# Patient Record
Sex: FEMALE | Race: WHITE
[De-identification: names, ages, dates, MRNs, and addresses within clinical notes are randomized per-mention and may not be internally consistent; named-entity substitution may affect disease eponyms.]

---

## 2017-04-05 ENCOUNTER — HOSPITAL ENCOUNTER (OUTPATIENT)
Dept: HOSPITAL 33 - SDC | Age: 77
Discharge: HOME | End: 2017-04-05
Attending: FAMILY MEDICINE
Payer: MEDICARE

## 2017-04-05 VITALS — OXYGEN SATURATION: 94 % | DIASTOLIC BLOOD PRESSURE: 65 MMHG | SYSTOLIC BLOOD PRESSURE: 118 MMHG | HEART RATE: 67 BPM

## 2017-04-05 DIAGNOSIS — D64.9: Primary | ICD-10-CM

## 2017-04-05 PROCEDURE — 36415 COLL VENOUS BLD VENIPUNCTURE: CPT

## 2017-04-05 PROCEDURE — 96366 THER/PROPH/DIAG IV INF ADDON: CPT

## 2017-04-05 PROCEDURE — 96365 THER/PROPH/DIAG IV INF INIT: CPT

## 2017-04-05 PROCEDURE — 86922 COMPATIBILITY TEST ANTIGLOB: CPT

## 2017-04-05 PROCEDURE — 86901 BLOOD TYPING SEROLOGIC RH(D): CPT

## 2017-04-05 PROCEDURE — 86900 BLOOD TYPING SEROLOGIC ABO: CPT

## 2017-04-05 PROCEDURE — 36430 TRANSFUSION BLD/BLD COMPNT: CPT

## 2017-04-05 PROCEDURE — 96374 THER/PROPH/DIAG INJ IV PUSH: CPT

## 2017-04-05 PROCEDURE — 86850 RBC ANTIBODY SCREEN: CPT

## 2018-01-02 ENCOUNTER — HOSPITAL ENCOUNTER (INPATIENT)
Dept: HOSPITAL 33 - ED | Age: 78
LOS: 5 days | Discharge: HOME | DRG: 195 | End: 2018-01-07
Attending: FAMILY MEDICINE | Admitting: FAMILY MEDICINE
Payer: MEDICARE

## 2018-01-02 DIAGNOSIS — J18.1: ICD-10-CM

## 2018-01-02 DIAGNOSIS — D46.9: ICD-10-CM

## 2018-01-02 DIAGNOSIS — D64.9: ICD-10-CM

## 2018-01-02 DIAGNOSIS — J02.9: ICD-10-CM

## 2018-01-02 DIAGNOSIS — R10.13: ICD-10-CM

## 2018-01-02 DIAGNOSIS — Z79.899: ICD-10-CM

## 2018-01-02 DIAGNOSIS — E87.6: ICD-10-CM

## 2018-01-02 DIAGNOSIS — I10: ICD-10-CM

## 2018-01-02 DIAGNOSIS — J18.9: Primary | ICD-10-CM

## 2018-01-02 DIAGNOSIS — M54.6: ICD-10-CM

## 2018-01-02 LAB
ALBUMIN SERPL-MCNC: 3.2 G/DL (ref 3.4–5)
ALP SERPL-CCNC: 86 U/L (ref 46–116)
ALT SERPL-CCNC: 18 U/L (ref 12–78)
AMYLASE SERPL-CCNC: 52 U/L (ref 25–115)
ANION GAP SERPL CALC-SCNC: 12.9 MEQ/L (ref 5–15)
AST SERPL QL: 19 U/L (ref 15–37)
BILIRUB BLD-MCNC: 0.6 MG/DL (ref 0.2–1)
BUN SERPL-MCNC: 15 MG/DL (ref 9–20)
CALCIUM SPEC-MCNC: 9.6 MG/DL (ref 8.5–10.1)
CELLS COUNTED: 100
CHLORIDE SERPL-SCNC: 101 MEQ/L (ref 98–107)
CO2 SERPL-SCNC: 27.1 MEQ/L (ref 21–32)
CREAT SERPL-MCNC: 0.82 MG/DL (ref 0.55–1.3)
FLUAV AG NPH QL IA: NEGATIVE
FLUBV AG NPH QL IA: NEGATIVE
GFR SERPLBLD BASED ON 1.73 SQ M-ARVRAT: > 60 ML/MIN
GLUCOSE SERPL-MCNC: 133 MG/DL (ref 70–110)
GRANULOCYTES # BLD AUTO: 11.24 10*3/UL (ref 1.4–6.9)
HCT VFR BLD AUTO: 44 % (ref 35–47)
HGB BLD-MCNC: 13.9 GM/DL (ref 12–16)
LIPASE SERPL-CCNC: 71 U/L (ref 73–393)
MANUAL DIF COMMENT BLD-IMP: NORMAL
MCH RBC QN AUTO: 31.2 PG (ref 26–32)
MCHC RBC AUTO-ENTMCNC: 31.6 G/DL (ref 32–36)
PLATELET # BLD AUTO: 224 K/MM3 (ref 150–450)
POTASSIUM SERPLBLD-SCNC: 3.9 MEQ/L (ref 3.5–5.1)
PROT SERPL-MCNC: 7.9 GM/DL (ref 6.4–8.2)
RBC # BLD AUTO: 4.46 M/MM3 (ref 4.1–5.4)
RSV AG SPEC QL IA: NEGATIVE
SODIUM SERPL-SCNC: 137 MEQ/L (ref 136–145)
VARIANT LYMPHS BLD QL SMEAR: 2 %
WBC # BLD AUTO: 12.9 K/MM3 (ref 4–10.5)

## 2018-01-02 PROCEDURE — 36000 PLACE NEEDLE IN VEIN: CPT

## 2018-01-02 PROCEDURE — 83036 HEMOGLOBIN GLYCOSYLATED A1C: CPT

## 2018-01-02 PROCEDURE — 94640 AIRWAY INHALATION TREATMENT: CPT

## 2018-01-02 PROCEDURE — 80048 BASIC METABOLIC PNL TOTAL CA: CPT

## 2018-01-02 PROCEDURE — 93005 ELECTROCARDIOGRAM TRACING: CPT

## 2018-01-02 PROCEDURE — 81000 URINALYSIS NONAUTO W/SCOPE: CPT

## 2018-01-02 PROCEDURE — 87086 URINE CULTURE/COLONY COUNT: CPT

## 2018-01-02 PROCEDURE — 96367 TX/PROPH/DG ADDL SEQ IV INF: CPT

## 2018-01-02 PROCEDURE — 96361 HYDRATE IV INFUSION ADD-ON: CPT

## 2018-01-02 PROCEDURE — 87631 RESP VIRUS 3-5 TARGETS: CPT

## 2018-01-02 PROCEDURE — 94760 N-INVAS EAR/PLS OXIMETRY 1: CPT

## 2018-01-02 PROCEDURE — 80053 COMPREHEN METABOLIC PANEL: CPT

## 2018-01-02 PROCEDURE — 85025 COMPLETE CBC W/AUTO DIFF WBC: CPT

## 2018-01-02 PROCEDURE — 87070 CULTURE OTHR SPECIMN AEROBIC: CPT

## 2018-01-02 PROCEDURE — 83735 ASSAY OF MAGNESIUM: CPT

## 2018-01-02 PROCEDURE — 36415 COLL VENOUS BLD VENIPUNCTURE: CPT

## 2018-01-02 PROCEDURE — 84484 ASSAY OF TROPONIN QUANT: CPT

## 2018-01-02 PROCEDURE — 71046 X-RAY EXAM CHEST 2 VIEWS: CPT

## 2018-01-02 PROCEDURE — 87040 BLOOD CULTURE FOR BACTERIA: CPT

## 2018-01-02 PROCEDURE — 82150 ASSAY OF AMYLASE: CPT

## 2018-01-02 PROCEDURE — 96360 HYDRATION IV INFUSION INIT: CPT

## 2018-01-02 PROCEDURE — 96375 TX/PRO/DX INJ NEW DRUG ADDON: CPT

## 2018-01-02 PROCEDURE — 83690 ASSAY OF LIPASE: CPT

## 2018-01-02 PROCEDURE — 87430 STREP A AG IA: CPT

## 2018-01-02 PROCEDURE — 96365 THER/PROPH/DIAG IV INF INIT: CPT

## 2018-01-02 PROCEDURE — 99285 EMERGENCY DEPT VISIT HI MDM: CPT

## 2018-01-02 RX ADMIN — ACETAMINOPHEN PRN MG: 325 TABLET ORAL at 23:30

## 2018-01-02 RX ADMIN — ALBUTEROL SULFATE SCH MG: 2.5 SOLUTION RESPIRATORY (INHALATION) at 23:29
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
## 2018-01-03 LAB
ALBUMIN SERPL-MCNC: 2.8 G/DL (ref 3.4–5)
ALP SERPL-CCNC: 83 U/L (ref 46–116)
ALT SERPL-CCNC: 17 U/L (ref 12–78)
ANION GAP SERPL CALC-SCNC: 12.4 MEQ/L (ref 5–15)
AST SERPL QL: 17 U/L (ref 15–37)
BASOPHILS # BLD AUTO: 0.01 10*3/UL (ref 0–0.4)
BASOPHILS NFR BLD AUTO: 0.1 % (ref 0–0.4)
BILIRUB BLD-MCNC: 0.4 MG/DL (ref 0.2–1)
BUN SERPL-MCNC: 13 MG/DL (ref 9–20)
CALCIUM SPEC-MCNC: 9.1 MG/DL (ref 8.5–10.1)
CHLORIDE SERPL-SCNC: 105 MEQ/L (ref 98–107)
CO2 SERPL-SCNC: 26 MEQ/L (ref 21–32)
CREAT SERPL-MCNC: 0.88 MG/DL (ref 0.55–1.3)
EOSINOPHIL # BLD AUTO: 0.03 10*3/UL (ref 0–0.5)
GFR SERPLBLD BASED ON 1.73 SQ M-ARVRAT: > 60 ML/MIN
GLUCOSE SERPL-MCNC: 131 MG/DL (ref 70–110)
GLUCOSE UR-MCNC: NEGATIVE MG/DL
GRANULOCYTES # BLD AUTO: 8.84 10*3/UL (ref 1.4–6.9)
HCT VFR BLD AUTO: 41.1 % (ref 35–47)
HGB BLD-MCNC: 13 GM/DL (ref 12–16)
LYMPHOCYTES # SPEC AUTO: 0.92 10*3/UL (ref 1–4.6)
MCH RBC QN AUTO: 31.5 PG (ref 26–32)
MCHC RBC AUTO-ENTMCNC: 31.6 G/DL (ref 32–36)
MONOCYTES # BLD AUTO: 1.04 10*3/UL (ref 0–1.3)
NEUTROPHILS NFR BLD AUTO: 81.5 % (ref 36–66)
PLATELET # BLD AUTO: 206 K/MM3 (ref 150–450)
POTASSIUM SERPLBLD-SCNC: 3.6 MEQ/L (ref 3.5–5.1)
PROT SERPL-MCNC: 7.5 GM/DL (ref 6.4–8.2)
PROT UR STRIP-MCNC: (no result) MG/DL
RBC # BLD AUTO: 4.13 M/MM3 (ref 4.1–5.4)
SODIUM SERPL-SCNC: 140 MEQ/L (ref 136–145)
WBC # BLD AUTO: 10.8 K/MM3 (ref 4–10.5)
WBC URNS QL MICRO: (no result) /HPF (ref 0–5)

## 2018-01-03 RX ADMIN — ALBUTEROL SULFATE SCH MG: 2.5 SOLUTION RESPIRATORY (INHALATION) at 10:39

## 2018-01-03 RX ADMIN — CEFTRIAXONE SCH MLS/HR: 1 INJECTION, SOLUTION INTRAVENOUS at 21:02

## 2018-01-03 RX ADMIN — ALBUTEROL SULFATE SCH MG: 2.5 SOLUTION RESPIRATORY (INHALATION) at 06:54

## 2018-01-03 RX ADMIN — HYDROCHLOROTHIAZIDE SCH MG: 25 TABLET ORAL at 09:38

## 2018-01-03 RX ADMIN — CLONIDINE HYDROCHLORIDE SCH MG: 0.1 TABLET ORAL at 09:38

## 2018-01-03 RX ADMIN — ALBUTEROL SULFATE SCH MG: 2.5 SOLUTION RESPIRATORY (INHALATION) at 23:06

## 2018-01-03 RX ADMIN — ALBUTEROL SULFATE SCH MG: 2.5 SOLUTION RESPIRATORY (INHALATION) at 14:47

## 2018-01-03 RX ADMIN — ALBUTEROL SULFATE SCH MG: 2.5 SOLUTION RESPIRATORY (INHALATION) at 03:35

## 2018-01-03 RX ADMIN — AMLODIPINE BESYLATE SCH MG: 5 TABLET ORAL at 09:38

## 2018-01-03 RX ADMIN — KETOROLAC TROMETHAMINE PRN MG: 30 INJECTION, SOLUTION INTRAMUSCULAR; INTRAVENOUS at 14:35

## 2018-01-03 RX ADMIN — ALBUTEROL SULFATE SCH MG: 2.5 SOLUTION RESPIRATORY (INHALATION) at 19:00

## 2018-01-03 RX ADMIN — CLONIDINE HYDROCHLORIDE SCH MG: 0.1 TABLET ORAL at 21:03

## 2018-01-03 RX ADMIN — KETOROLAC TROMETHAMINE PRN MG: 30 INJECTION, SOLUTION INTRAMUSCULAR; INTRAVENOUS at 06:28

## 2018-01-03 RX ADMIN — AZITHROMYCIN DIHYDRATE SCH MLS/HR: 500 INJECTION, POWDER, LYOPHILIZED, FOR SOLUTION INTRAVENOUS at 21:02

## 2018-01-03 NOTE — XRAY
Indication: Chest pain, short of breath, and cough.



Comparison: April 25, 2015.



AP/lateral chest less inflated today with new bibasilar

infiltrates/atelectasis and tiny effusions.  Heart is not enlarged for AP

portable technique.  Bony thorax intact again with osteopenia, degenerative

changes, and mid thoracic kyphoplasty.

## 2018-01-04 LAB
ANION GAP SERPL CALC-SCNC: 8.2 MEQ/L (ref 5–15)
BASOPHILS # BLD AUTO: 0.01 10*3/UL (ref 0–0.4)
BASOPHILS NFR BLD AUTO: 0.2 % (ref 0–0.4)
BUN SERPL-MCNC: 8 MG/DL (ref 9–20)
CALCIUM SPEC-MCNC: 8.2 MG/DL (ref 8.5–10.1)
CHLORIDE SERPL-SCNC: 109 MEQ/L (ref 98–107)
CO2 SERPL-SCNC: 27.1 MEQ/L (ref 21–32)
CREAT SERPL-MCNC: 0.62 MG/DL (ref 0.55–1.3)
EOSINOPHIL # BLD AUTO: 0.13 10*3/UL (ref 0–0.5)
GFR SERPLBLD BASED ON 1.73 SQ M-ARVRAT: > 60 ML/MIN
GLUCOSE SERPL-MCNC: 110 MG/DL (ref 70–110)
GRANULOCYTES # BLD AUTO: 4.66 10*3/UL (ref 1.4–6.9)
HCT VFR BLD AUTO: 37.3 % (ref 35–47)
HGB BLD-MCNC: 11.5 GM/DL (ref 12–16)
LYMPHOCYTES # SPEC AUTO: 0.82 10*3/UL (ref 1–4.6)
MCH RBC QN AUTO: 31 PG (ref 26–32)
MCHC RBC AUTO-ENTMCNC: 30.8 G/DL (ref 32–36)
MONOCYTES # BLD AUTO: 0.9 10*3/UL (ref 0–1.3)
NEUTROPHILS NFR BLD AUTO: 71.4 % (ref 36–66)
PLATELET # BLD AUTO: 185 K/MM3 (ref 150–450)
POTASSIUM SERPLBLD-SCNC: 3.2 MEQ/L (ref 3.5–5.1)
RBC # BLD AUTO: 3.7 M/MM3 (ref 4.1–5.4)
SODIUM SERPL-SCNC: 141 MEQ/L (ref 136–145)
WBC # BLD AUTO: 6.5 K/MM3 (ref 4–10.5)

## 2018-01-04 RX ADMIN — CLONIDINE HYDROCHLORIDE SCH MG: 0.1 TABLET ORAL at 09:29

## 2018-01-04 RX ADMIN — HYDROCHLOROTHIAZIDE SCH MG: 25 TABLET ORAL at 09:30

## 2018-01-04 RX ADMIN — KETOROLAC TROMETHAMINE PRN MG: 30 INJECTION, SOLUTION INTRAMUSCULAR; INTRAVENOUS at 02:11

## 2018-01-04 RX ADMIN — CLONIDINE HYDROCHLORIDE SCH MG: 0.1 TABLET ORAL at 21:17

## 2018-01-04 RX ADMIN — ALBUTEROL SULFATE SCH MG: 2.5 SOLUTION RESPIRATORY (INHALATION) at 20:42

## 2018-01-04 RX ADMIN — AZITHROMYCIN DIHYDRATE SCH MLS/HR: 500 INJECTION, POWDER, LYOPHILIZED, FOR SOLUTION INTRAVENOUS at 21:15

## 2018-01-04 RX ADMIN — ALBUTEROL SULFATE SCH MG: 2.5 SOLUTION RESPIRATORY (INHALATION) at 23:49

## 2018-01-04 RX ADMIN — ALBUTEROL SULFATE SCH MG: 2.5 SOLUTION RESPIRATORY (INHALATION) at 14:31

## 2018-01-04 RX ADMIN — ALBUTEROL SULFATE SCH MG: 2.5 SOLUTION RESPIRATORY (INHALATION) at 10:19

## 2018-01-04 RX ADMIN — ALBUTEROL SULFATE SCH MG: 2.5 SOLUTION RESPIRATORY (INHALATION) at 06:40

## 2018-01-04 RX ADMIN — ALBUTEROL SULFATE SCH MG: 2.5 SOLUTION RESPIRATORY (INHALATION) at 03:18

## 2018-01-04 RX ADMIN — AMLODIPINE BESYLATE SCH MG: 5 TABLET ORAL at 09:29

## 2018-01-04 RX ADMIN — CEFTRIAXONE SCH MLS/HR: 1 INJECTION, SOLUTION INTRAVENOUS at 19:54

## 2018-01-04 RX ADMIN — ACETAMINOPHEN PRN MG: 325 TABLET ORAL at 21:21

## 2018-01-04 NOTE — PCM.NOTE
Date and Time: 01/04/18 0859





Subjective Assessment: 





Pt was up walking, pain is now 3/10 in R chest.  Some cough. Darling po.





- Review of Systems


Constitutional: No Fever


Respiratory: Cough





Objective Exam


General Appearance: no apparent distress, alert


Neurologic Exam: oriented x 3, cooperative


Skin Exam: normal color, warm, dry


Eye Exam: eyes nml inspection


Ears, Nose, Throat Exam: moist mucous membranes


Respiratory Exam: normal breath sounds, lungs clear, No crackles/rales, No 

rhonchi, No wheezing


Cardiovascular Exam: regular rate/rhythm, normal heart sounds, No murmur


Extremity Exam: No pedal edema, No swelling


Back Exam: normal inspection, No rash





OBJECTIVE DATA


Vital Signs: 


 Vital Signs - 24 hr











  Temp Pulse Resp BP Pulse Ox


 


 01/04/18 07:43    18  


 


 01/04/18 07:11  98.3 F  88  18  154/70  97


 


 01/04/18 06:43   86  18   95


 


 01/04/18 04:00  99.0 F  91 H  19  143/68  93 L


 


 01/04/18 03:00   88  18   92 L


 


 01/04/18 00:00  98.4 F  95 H  19  136/61  92 L


 


 01/03/18 23:00   81  22   92 L


 


 01/03/18 20:00  98.3 F  90  18  130/60  94 L


 


 01/03/18 19:03   87  18   94 L


 


 01/03/18 16:00  98.7 F  93 H  18  122/58  94 L


 


 01/03/18 14:50   86  18   93 L


 


 01/03/18 11:17  98.2 F  86  18  123/60  94 L


 


 01/03/18 10:42   75  20   93 L








 Oxygen-Last 24 hours











O2 Percentage                  2 Liters = 28%


 


O2 Percentage                  2 Liters = 28%


 


O2 Percentage                  2 Liters = 28%


 


O2 Percentage                  2 Liters = 28%


 


O2 Percentage                  2 Liters = 28%


 


O2 Percentage                  2 Liters = 28%











 Pain Assessment - Last Documented











Pain Intensity                 6


 


Pain Scale Used                0-10 Pain Scale











Intake and Output: 


 Intake & Output











 01/01/18 01/02/18 01/03/18 01/04/18





 11:59 11:59 11:59 11:59


 


Intake Total   240 4263


 


Output Total   400 1700


 


Balance   -160 2563


 


Weight    76.204 kg











Lab Results: 


 Lab Results-Last 24 Hours











  01/03/18 01/03/18 01/04/18 Range/Units





  20:37 23:22 02:25 


 


WBC     (4.0-10.5)  K/mm3


 


RBC     (4.1-5.4)  M/mm3


 


Hgb     (12.0-16.0)  gm/dl


 


Hct     (35-47)  %


 


MCV     ()  fl


 


MCH     (26-32)  pg


 


MCHC     (32-36)  g/dl


 


RDW     (11.5-14.0)  %


 


Plt Count     (150-450)  K/mm3


 


MPV     (6-9.5)  fl


 


Gran %     (36.0-66.0)  %


 


Lymphocytes %     (24.0-44.0)  %


 


Monocytes %     (0.0-12.0)  %


 


Eosinophils %     (0.00-5.0)  %


 


Basophils %     (0.0-0.4)  %


 


Basophils #     (0-0.4)  


 


Sodium     (136-145)  mEq/L


 


Potassium     (3.5-5.1)  mEq/L


 


Chloride     ()  mEq/L


 


Carbon Dioxide     (21-32)  mEq/L


 


Anion Gap     (5-15)  MEQ/L


 


BUN     (9-20)  mg/dL


 


Creatinine     (0.55-1.30)  mg/dl


 


Estimated GFR     ML/MIN


 


Glucose     ()  MG/DL


 


Calcium     (8.5-10.1)  mg/dL


 


Troponin I  < 0.017  < 0.017  < 0.017  (0.000-0.056)  ng/ml














  01/04/18 01/04/18 01/04/18 Range/Units





  05:00 05:00 05:00 


 


WBC  6.5    (4.0-10.5)  K/mm3


 


RBC  3.70 L    (4.1-5.4)  M/mm3


 


Hgb  11.5 L    (12.0-16.0)  gm/dl


 


Hct  37.3    (35-47)  %


 


MCV  100.8 H    ()  fl


 


MCH  31.0    (26-32)  pg


 


MCHC  30.8 L    (32-36)  g/dl


 


RDW  12.3    (11.5-14.0)  %


 


Plt Count  185    (150-450)  K/mm3


 


MPV  10.2 H    (6-9.5)  fl


 


Gran %  71.4 H    (36.0-66.0)  %


 


Lymphocytes %  12.6 L    (24.0-44.0)  %


 


Monocytes %  13.8 H    (0.0-12.0)  %


 


Eosinophils %  2.0    (0.00-5.0)  %


 


Basophils %  0.2    (0.0-0.4)  %


 


Basophils #  0.01    (0-0.4)  


 


Sodium   141   (136-145)  mEq/L


 


Potassium   3.2 L   (3.5-5.1)  mEq/L


 


Chloride   109 H   ()  mEq/L


 


Carbon Dioxide   27.1   (21-32)  mEq/L


 


Anion Gap   8.2   (5-15)  MEQ/L


 


BUN   8 L   (9-20)  mg/dL


 


Creatinine   0.62   (0.55-1.30)  mg/dl


 


Estimated GFR   > 60   ML/MIN


 


Glucose   110   ()  MG/DL


 


Calcium   8.2 L   (8.5-10.1)  mg/dL


 


Troponin I    < 0.017  (0.000-0.056)  ng/ml











Multi-Disciplinary Progress Notes: 


 Multi-Disciplinary Progress Notes





01/03/18 11:44 Case Management Note by Kimberly Vasquez





PT WAS MADE INPT . IMPORTANT MESSAGE FROM MEDICARE GIVEN TO PT SIGNED AND COPY 

TO CHART.





Initialized on 01/03/18 11:44 - END OF NOTE








01/03/18 09:51 Case Management Note by Kimberly Vasquez


MEDICARE [facility] PAPERS GIVEN TO PT SIGNED COPY TO CHART.





Initialized on 01/03/18 09:51 - END OF NOTE

















Assessment/Plan


(1) Pneumonia


Current Visit: Yes   Status: Acute   


Assessment & Plan: 


Improving, still having pain, still on O2.  Will need at least another day, 

perhaps 2, on IV antibiotics before d/c home.


Code(s): J18.9 - PNEUMONIA, UNSPECIFIED ORGANISM   





(2) Abdominal pain


Current Visit: Yes   Status: Acute   


Qualifiers: 


   Abdominal location: epigastric   Qualified Code(s): R10.13 - Epigastric pain

   


Assessment & Plan: 


the RUQ pain much better, I think due to PNA.


Code(s): R10.9 - UNSPECIFIED ABDOMINAL PAIN   





(3) Back pain


Current Visit: Yes   Status: Acute   


Qualifiers: 


   Back pain location: thoracic back pain   Chronicity: acute   Back pain 

laterality: right   Qualified Code(s): M54.6 - Pain in thoracic spine   


Assessment & Plan: 


improved


Code(s): M54.9 - DORSALGIA, UNSPECIFIED

## 2018-01-05 LAB
ANION GAP SERPL CALC-SCNC: 13.5 MEQ/L (ref 5–15)
BUN SERPL-MCNC: 7 MG/DL (ref 9–20)
CALCIUM SPEC-MCNC: 8.4 MG/DL (ref 8.5–10.1)
CHLORIDE SERPL-SCNC: 106 MEQ/L (ref 98–107)
CO2 SERPL-SCNC: 25.6 MEQ/L (ref 21–32)
CREAT SERPL-MCNC: 0.76 MG/DL (ref 0.55–1.3)
GFR SERPLBLD BASED ON 1.73 SQ M-ARVRAT: > 60 ML/MIN
GLUCOSE SERPL-MCNC: 162 MG/DL (ref 70–110)
MAGNESIUM SERPL-MCNC: 1.9 MG/DL (ref 1.8–2.4)
POTASSIUM SERPLBLD-SCNC: 3.2 MEQ/L (ref 3.5–5.1)
SODIUM SERPL-SCNC: 142 MEQ/L (ref 136–145)

## 2018-01-05 RX ADMIN — ALBUTEROL SULFATE SCH MG: 2.5 SOLUTION RESPIRATORY (INHALATION) at 07:07

## 2018-01-05 RX ADMIN — HYDROCHLOROTHIAZIDE SCH MG: 25 TABLET ORAL at 10:19

## 2018-01-05 RX ADMIN — AMLODIPINE BESYLATE SCH MG: 5 TABLET ORAL at 10:19

## 2018-01-05 RX ADMIN — CLONIDINE HYDROCHLORIDE SCH MG: 0.1 TABLET ORAL at 21:02

## 2018-01-05 RX ADMIN — ALBUTEROL SULFATE SCH MG: 2.5 SOLUTION RESPIRATORY (INHALATION) at 22:50

## 2018-01-05 RX ADMIN — ALBUTEROL SULFATE SCH MG: 2.5 SOLUTION RESPIRATORY (INHALATION) at 11:07

## 2018-01-05 RX ADMIN — ALBUTEROL SULFATE SCH MG: 2.5 SOLUTION RESPIRATORY (INHALATION) at 15:41

## 2018-01-05 RX ADMIN — AZITHROMYCIN DIHYDRATE SCH MLS/HR: 500 INJECTION, POWDER, LYOPHILIZED, FOR SOLUTION INTRAVENOUS at 21:57

## 2018-01-05 RX ADMIN — CLONIDINE HYDROCHLORIDE SCH MG: 0.1 TABLET ORAL at 10:19

## 2018-01-05 RX ADMIN — ALBUTEROL SULFATE SCH MG: 2.5 SOLUTION RESPIRATORY (INHALATION) at 03:02

## 2018-01-05 RX ADMIN — ALBUTEROL SULFATE SCH MG: 2.5 SOLUTION RESPIRATORY (INHALATION) at 19:40

## 2018-01-05 RX ADMIN — CEFTRIAXONE SCH MLS/HR: 1 INJECTION, SOLUTION INTRAVENOUS at 21:01

## 2018-01-05 NOTE — PCM.NOTE
Date and Time: 01/05/18  0810





Subjective Assessment: 





She is feeling better.  Pain in R lower chest is perhapes 1/10.  O2 sats have 

been 90-92% on RA.  Did not wear any O2 last night.





Objective Exam


General Appearance: no apparent distress, alert


Neurologic Exam: oriented x 3, cooperative


Skin Exam: normal color, warm, dry, No rash


Respiratory Exam: normal breath sounds, crackles/rales (slight RLL), No rhonchi

, No wheezing


Cardiovascular Exam: regular rate/rhythm, normal heart sounds, No murmur


Extremity Exam: normal inspection


Back Exam: normal inspection, No rash





OBJECTIVE DATA


Vital Signs: 


 Vital Signs - 24 hr











  Temp Pulse Resp BP Pulse Ox


 


 01/05/18 07:34  96.1 F  80  18  149/77  94 L


 


 01/05/18 07:10   83  16   92 L


 


 01/05/18 04:00  98.0 F  88  18  138/80  90 L


 


 01/05/18 03:03   81  18   91 L


 


 01/05/18 00:00  98.0 F  91 H  19  117/61  92 L


 


 01/04/18 23:50   84  18   91 L


 


 01/04/18 20:42   98 H  18   93 L


 


 01/04/18 20:00  98.9 F  105 H  18  155/74  93 L


 


 01/04/18 15:58  97.6 F  86  18  132/60  94 L


 


 01/04/18 15:40    18  


 


 01/04/18 14:33   82  18   95


 


 01/04/18 11:45    18  


 


 01/04/18 11:02  97.7 F  87  18  135/62  93 L


 


 01/04/18 10:23   83  20   98








 Oxygen-Last 24 hours











O2 Percentage                  2 Liters = 28%











 Pain Assessment - Last Documented











Pain Intensity                 0


 


Pain Scale Used                0-10 Pain Scale,FLACC











Intake and Output: 


 Intake & Output











 01/02/18 01/03/18 01/04/18 01/05/18





 11:59 11:59 11:59 11:59


 


Intake Total  240 4263 3811


 


Output Total  400 1700 3900


 


Balance  -160 2563 -89


 


Weight   76.204 kg 











Lab Results: 


 Lab Results-Last 24 Hours











  01/04/18 Range/Units





  08:34 


 


Troponin I  < 0.017  (0.000-0.056)  ng/ml











Multi-Disciplinary Progress Notes: 


 Multi-Disciplinary Progress Notes





01/04/18 12:02 Case Management Note by Kimberly Vasquez





PT CONT TO VOICE NO NEW NEEDS AT DISCHARGE, WILL CONT TO FOLLOW ANY NEEDS.





Initialized on 01/04/18 12:02 - END OF NOTE

















Assessment/Plan


(1) Pneumonia


Current Visit: Yes   Status: Acute   


Assessment & Plan: 


Improved; would like to see if she oxygenates well while walking. She may 

benefit from another day on IV fluids and antibiotics.


Code(s): J18.9 - PNEUMONIA, UNSPECIFIED ORGANISM   





(2) Abdominal pain


Current Visit: Yes   Status: Acute   


Qualifiers: 


   Abdominal location: epigastric   Qualified Code(s): R10.13 - Epigastric pain

   


Assessment & Plan: 


Nearly resolved. Related to pneumonia.


Code(s): R10.9 - UNSPECIFIED ABDOMINAL PAIN   





(3) Back pain


Current Visit: Yes   Status: Resolved   


Qualifiers: 


   Back pain location: thoracic back pain   Chronicity: acute   Back pain 

laterality: right   Qualified Code(s): M54.6 - Pain in thoracic spine   


Code(s): M54.9 - DORSALGIA, UNSPECIFIED   





(4) Anemia


Current Visit: Yes   Status: Acute   


Qualifiers: 


   Anemia type: unspecified type   Qualified Code(s): D64.9 - Anemia, 

unspecified   


Assessment & Plan: 


has hx myelodysplastic d/o, was treated by oncology. will recheck today.


Code(s): D64.9 - ANEMIA, UNSPECIFIED

## 2018-01-06 RX ADMIN — ALBUTEROL SULFATE SCH: 2.5 SOLUTION RESPIRATORY (INHALATION) at 13:39

## 2018-01-06 RX ADMIN — CLONIDINE HYDROCHLORIDE SCH MG: 0.1 TABLET ORAL at 09:41

## 2018-01-06 RX ADMIN — CLONIDINE HYDROCHLORIDE SCH MG: 0.1 TABLET ORAL at 21:47

## 2018-01-06 RX ADMIN — ALBUTEROL SULFATE SCH MG: 2.5 SOLUTION RESPIRATORY (INHALATION) at 03:22

## 2018-01-06 RX ADMIN — CEFTRIAXONE SCH MLS/HR: 1 INJECTION, SOLUTION INTRAVENOUS at 21:47

## 2018-01-06 RX ADMIN — HYDROCHLOROTHIAZIDE SCH MG: 25 TABLET ORAL at 09:41

## 2018-01-06 RX ADMIN — ALBUTEROL SULFATE SCH MG: 2.5 SOLUTION RESPIRATORY (INHALATION) at 23:09

## 2018-01-06 RX ADMIN — AZITHROMYCIN DIHYDRATE SCH MLS/HR: 500 INJECTION, POWDER, LYOPHILIZED, FOR SOLUTION INTRAVENOUS at 22:39

## 2018-01-06 RX ADMIN — AMLODIPINE BESYLATE SCH MG: 5 TABLET ORAL at 09:41

## 2018-01-06 RX ADMIN — ALBUTEROL SULFATE SCH MG: 2.5 SOLUTION RESPIRATORY (INHALATION) at 14:06

## 2018-01-06 RX ADMIN — ALBUTEROL SULFATE SCH MG: 2.5 SOLUTION RESPIRATORY (INHALATION) at 07:20

## 2018-01-06 RX ADMIN — ALBUTEROL SULFATE SCH MG: 2.5 SOLUTION RESPIRATORY (INHALATION) at 19:30

## 2018-01-06 NOTE — PCM.NOTE
Date and Time: 01/06/18  1136





Subjective Assessment: 





Patient reports she was very short of breath and her oxygen saturation dropped 

when she walked yesterday.  She reports they thought about sending her home 

this weekend but then this happened. She reports her appetite has been good.  





- Review of Systems


Constitutional: Weakness


Eyes: No Symptoms


Ears, Nose, & Throat: No Symptoms


Respiratory: Cough, Short Of Breath


Cardiac: No Symptoms


Abdominal/Gastrointestinal: No Symptoms


Genitourinary Symptoms: No Symptoms


Musculoskeletal: No Symptoms


Skin: No Symptoms





Objective Exam


General Appearance: no apparent distress, alert, other (son at bedside)


Neurologic Exam: alert, cooperative, normal mood/affect


Skin Exam: normal color, warm, dry, No rash


Respiratory Exam: other (fine crackles in right lung fields, equal breath sounds

, no wheezing)


Cardiovascular Exam: regular rate/rhythm, normal heart sounds, No murmur, No 

friction rub, No gallop


Gastrointestinal/Abdomen Exam: soft, normal bowel sounds, No tenderness, No 

distention, No mass


Extremity Exam: normal inspection, other (no c/c/e, SCD in place)





OBJECTIVE DATA


Vital Signs: 


 Vital Signs - 24 hr











  Temp Pulse Resp BP Pulse Ox


 


 01/06/18 07:40  98.4 F  88  20  146/76  96


 


 01/06/18 07:00   87  18   95


 


 01/06/18 04:11  97.8 F  84  18  164/77  96


 


 01/06/18 03:23   84  18   96


 


 01/05/18 23:44  98.3 F  93 H  24  152/73  94 L


 


 01/05/18 22:50   86  18   92 L


 


 01/05/18 20:15  98.3 F  86  20  160/74  96


 


 01/05/18 19:41   83  16   95


 


 01/05/18 18:00    16  


 


 01/05/18 16:00  97.9 F  92 H  18  141/67  96


 


 01/05/18 15:44   90  20   94 L


 


 01/05/18 14:00    20  


 


 01/05/18 11:47  97.6 F  81  18  125/60  92 L








 Oxygen-Last 24 hours











O2 Percentage                  2 Liters = 28%


 


O2 Percentage                  2 Liters = 28%


 


O2 Percentage                  2 Liters = 28%


 


O2 Percentage                  2 Liters = 28%











 Pain Assessment - Last Documented











Pain Intensity                 0


 


Pain Scale Used                0-10 Pain Scale











Intake and Output: 


 Intake & Output











 01/04/18 01/05/18 01/06/18 01/07/18





 06:59 06:59 06:59 06:59


 


Intake Total 4383 3931 4443 380


 


Output Total 1400 3500 4000 1700


 


Balance 2983 431 443 -1320


 


Weight 76.204 kg  72.938 kg 











Multi-Disciplinary Progress Notes: 


 Multi-Disciplinary Progress Notes





01/05/18 12:49 Respiratory Note by Narcisa Carpio





PT WALKED DOWN THE DALE WHILE ON ROOM AIR. O2 SAT DROPPED DOWN TO 87%. PT WAS 

THEN PLACED ON 2LPM NASAL CANNULA. O2 SAT INCREASED TO 97%. NURSE AWARE.





Initialized on 01/05/18 12:49 - END OF NOTE

















Assessment/Plan


(1) Pneumonia


Current Visit: Yes   Status: Acute   


Assessment & Plan: 


Continue antibiotics. Try to wean off oxygen. Consider home oxygen evaluation 

tomorrow before possible discharge. Decrease IV fluids as she is taking fluids 

by mouth well.


Code(s): J18.9 - PNEUMONIA, UNSPECIFIED ORGANISM   





(2) Hypokalemia


Current Visit: Yes   Status: Acute   


Assessment & Plan: 


Her potassium was 3.2 yesterday. I called the pharmacist and he stated he did 

not see where she received any potassium yesterday.  I will give her KCl 40 MEq 

po once.  


Code(s): E87.6 - HYPOKALEMIA   





(3) Myelodysplastic syndrome


Current Visit: Yes   Status: Acute   


Assessment & Plan: 


stable. Recheck CBC in AM.


Code(s): D46.9 - MYELODYSPLASTIC SYNDROME, UNSPECIFIED

## 2018-01-07 VITALS — DIASTOLIC BLOOD PRESSURE: 75 MMHG | OXYGEN SATURATION: 95 % | SYSTOLIC BLOOD PRESSURE: 129 MMHG | HEART RATE: 78 BPM

## 2018-01-07 LAB
ANION GAP SERPL CALC-SCNC: 11 MEQ/L (ref 5–15)
BUN SERPL-MCNC: 6 MG/DL (ref 9–20)
CALCIUM SPEC-MCNC: 9 MG/DL (ref 8.5–10.1)
CELLS COUNTED: 100
CHLORIDE SERPL-SCNC: 107 MEQ/L (ref 98–107)
CO2 SERPL-SCNC: 27.2 MEQ/L (ref 21–32)
CREAT SERPL-MCNC: 0.6 MG/DL (ref 0.55–1.3)
GFR SERPLBLD BASED ON 1.73 SQ M-ARVRAT: > 60 ML/MIN
GLUCOSE SERPL-MCNC: 103 MG/DL (ref 70–110)
GRANULOCYTES # BLD AUTO: 3.47 10*3/UL (ref 1.4–6.9)
HCT VFR BLD AUTO: 38.7 % (ref 35–47)
HGB BLD-MCNC: 12.3 GM/DL (ref 12–16)
MANUAL DIF COMMENT BLD-IMP: NORMAL
MCH RBC QN AUTO: 31.2 PG (ref 26–32)
MCHC RBC AUTO-ENTMCNC: 31.8 G/DL (ref 32–36)
PLATELET # BLD AUTO: 198 K/MM3 (ref 150–450)
POTASSIUM SERPLBLD-SCNC: 3.6 MEQ/L (ref 3.5–5.1)
RBC # BLD AUTO: 3.94 M/MM3 (ref 4.1–5.4)
SODIUM SERPL-SCNC: 142 MEQ/L (ref 136–145)
WBC # BLD AUTO: 5.6 K/MM3 (ref 4–10.5)

## 2018-01-07 RX ADMIN — ALBUTEROL SULFATE SCH MG: 2.5 SOLUTION RESPIRATORY (INHALATION) at 10:05

## 2018-01-07 RX ADMIN — AMLODIPINE BESYLATE SCH MG: 5 TABLET ORAL at 09:47

## 2018-01-07 RX ADMIN — ALBUTEROL SULFATE SCH: 2.5 SOLUTION RESPIRATORY (INHALATION) at 13:06

## 2018-01-07 RX ADMIN — HYDROCHLOROTHIAZIDE SCH MG: 25 TABLET ORAL at 09:47

## 2018-01-07 RX ADMIN — ALBUTEROL SULFATE SCH MG: 2.5 SOLUTION RESPIRATORY (INHALATION) at 03:34

## 2018-01-07 RX ADMIN — CLONIDINE HYDROCHLORIDE SCH MG: 0.1 TABLET ORAL at 09:47

## 2018-01-07 NOTE — PCM.DCORD
- Discharge


Discharge Date: 01/07/18


Disposition: Home, Self-Care


Condition: Good


Prescriptions: 


New


   Albuterol Sulfate [Albuterol Sulfate Hfa] 2 puffs IH Q4HPRN PRN #1 hfa.aer.ad


     PRN Reason: Shortness Of Breath/Wheezing


   Cefdinir 300 mg** [Omnicef 300 mg**] 300 mg PO BID #12 capsule


   Azithromycin 250 mg*** [Zithromax 250 MG TABLET***] 250 mg PO DAILY #1 tablet





Continue


   Clonidine HCl 0.1 mg*** [Catapres 0.1 MG***] 0.1 mg PO BID


   Lisinopril 20 mg*** [Zestril 20 MG***] 20 mg PO DAILY #30 tablet


   Amlodipine Besylate 5 mg*** [Norvasc 5 mg***] 5 mg PO DAILY


Follow up with: 


BIRD RICHARDSON [Primary Care Provider] - 01/12/18 9:00 am

## 2018-01-10 NOTE — DS
DISCHARGE DIAGNOSES: 

1) PNEUMONIA, BILATERAL LOWER LOBES. 

2) HYPOKALEMIA. 

3) MYELODYSPLASTIC SYNDROME. 



DISCHARGE PHYSICAL EXAMINATION: 

VITALS: Temperature current 97.8F, temperature max 98.6F, heart rate 72 to 89, respiratory 
rate 18 to 20, blood pressure 129 to 190 over 75 to 95 currently 129/75. Oxygen saturation 
92 to 95% on room air. 

GENERAL: The patient was ambulating in her room and in no acute distress.   

CVS:  She has a regular rate and rhythm. No murmurs, gallops or rubs are appreciated.

CHEST: Clear to auscultation bilaterally. No crackles or wheezes.

ABDOMEN: Soft, nontender, nondistended with normal bowel sounds. 

EXTREMITIES:  No clubbing, cyanosis or edema. On her fingers she does have cyanosis due to 
Raynaud's phenomenon. 

SKIN: Warm, dry and intact. 



HOSPITAL COURSE:

1) PNEUMONIA BILATERAL LOWER LOBE: She was given azithromycin four doses here in the 
hospital and will give her one more dose of azithromycin 250 mg p.o. to take at home. She 
was on ceftriaxone 1 gm IV daily x4 days here in the hospital. I plan to complete a ten 
day course with Cefdinir 300 mg p.o. b.i.d. We tried to qualify the patient for home 
oxygen yesterday before discharge but she did not qualify. She will be sent home with an 
Albuterol inhaler 2 puffs every four hours as needed and she will closely follow up with 
her primary care physician, Dr. Servin. 

2) HYPOKALEMIA: She was given 40 mEq of potassium chloride on 01/06/2018 for potassium of 
3.2. Her potassium was normal today at 2.6. 

3) HISTORY OF MYELODYSPLASTIC SYNDROME: Her white blood cell count was normal at 5.6, 
hemoglobin 12.3, PLT count 198,000. 



DISCHARGE MEDICATIONS:  She is resuming all of her home medications with the addition of 
azithromycin, Cefdinir and Albuterol inhaler. 



FOLLOW UP:  Follow up with Dr. Servin.



DISPOSITION: The patient was discharged to home in good condition.

## 2018-12-21 ENCOUNTER — HOSPITAL ENCOUNTER (INPATIENT)
Dept: HOSPITAL 33 - MED SURG | Age: 78
LOS: 2 days | Discharge: HOME | DRG: 195 | End: 2018-12-23
Attending: FAMILY MEDICINE | Admitting: FAMILY MEDICINE
Payer: MEDICARE

## 2018-12-21 DIAGNOSIS — R10.9: ICD-10-CM

## 2018-12-21 DIAGNOSIS — J18.9: Primary | ICD-10-CM

## 2018-12-21 DIAGNOSIS — I10: ICD-10-CM

## 2018-12-21 DIAGNOSIS — Z79.899: ICD-10-CM

## 2018-12-21 DIAGNOSIS — D46.9: ICD-10-CM

## 2018-12-21 DIAGNOSIS — I65.8: ICD-10-CM

## 2018-12-21 LAB
ALBUMIN SERPL-MCNC: 3.9 G/DL (ref 3.5–5)
ALP SERPL-CCNC: 94 U/L (ref 38–126)
ALT SERPL-CCNC: 17 U/L (ref 0–35)
AMYLASE SERPL-CCNC: 76 U/L (ref 30–110)
ANION GAP SERPL CALC-SCNC: 11.5 MEQ/L (ref 5–15)
AST SERPL QL: 26 U/L (ref 14–36)
BASOPHILS # BLD AUTO: 0.02 10*3/UL (ref 0–0.4)
BASOPHILS NFR BLD AUTO: 0.3 % (ref 0–0.4)
BILIRUB BLD-MCNC: 0.5 MG/DL (ref 0.2–1.3)
BUN SERPL-MCNC: 15 MG/DL (ref 7–17)
CALCIUM SPEC-MCNC: 9.6 MG/DL (ref 8.4–10.2)
CHLORIDE SERPL-SCNC: 105 MMOL/L (ref 98–107)
CO2 SERPL-SCNC: 27 MMOL/L (ref 22–30)
CREAT SERPL-MCNC: 0.84 MG/DL (ref 0.52–1.04)
EOSINOPHIL # BLD AUTO: 0.15 10*3/UL (ref 0–0.5)
GLUCOSE SERPL-MCNC: 121 MG/DL (ref 74–106)
GLUCOSE UR-MCNC: NEGATIVE MG/DL
GRANULOCYTES # BLD AUTO: 4.27 10*3/UL (ref 1.4–6.9)
HCT VFR BLD AUTO: 47.4 % (ref 35–47)
HGB BLD-MCNC: 15.1 GM/DL (ref 12–16)
LIPASE SERPL-CCNC: 70 U/L (ref 23–300)
LYMPHOCYTES # SPEC AUTO: 0.85 10*3/UL (ref 1–4.6)
MCH RBC QN AUTO: 31.4 PG (ref 26–32)
MCHC RBC AUTO-ENTMCNC: 31.9 G/DL (ref 32–36)
MONOCYTES # BLD AUTO: 0.77 10*3/UL (ref 0–1.3)
NEUTROPHILS NFR BLD AUTO: 70.5 % (ref 36–66)
PLATELET # BLD AUTO: 228 K/MM3 (ref 150–450)
POTASSIUM SERPLBLD-SCNC: 3.9 MMOL/L (ref 3.5–5.1)
PROT SERPL-MCNC: 7.6 G/DL (ref 6.3–8.2)
PROT UR STRIP-MCNC: NEGATIVE MG/DL
RBC # BLD AUTO: 4.81 M/MM3 (ref 4.1–5.4)
SODIUM SERPL-SCNC: 140 MMOL/L (ref 137–145)
WBC # BLD AUTO: 6.1 K/MM3 (ref 4–10.5)

## 2018-12-21 PROCEDURE — 80053 COMPREHEN METABOLIC PANEL: CPT

## 2018-12-21 PROCEDURE — 81001 URINALYSIS AUTO W/SCOPE: CPT

## 2018-12-21 PROCEDURE — 36415 COLL VENOUS BLD VENIPUNCTURE: CPT

## 2018-12-21 PROCEDURE — 85025 COMPLETE CBC W/AUTO DIFF WBC: CPT

## 2018-12-21 PROCEDURE — 94760 N-INVAS EAR/PLS OXIMETRY 1: CPT

## 2018-12-21 PROCEDURE — 71046 X-RAY EXAM CHEST 2 VIEWS: CPT

## 2018-12-21 PROCEDURE — 83690 ASSAY OF LIPASE: CPT

## 2018-12-21 PROCEDURE — 82150 ASSAY OF AMYLASE: CPT

## 2018-12-21 RX ADMIN — GUAIFENESIN SCH MG: 600 TABLET, EXTENDED RELEASE ORAL at 21:47

## 2018-12-21 RX ADMIN — AZITHROMYCIN DIHYDRATE SCH MLS/HR: 500 INJECTION, POWDER, LYOPHILIZED, FOR SOLUTION INTRAVENOUS at 13:30

## 2018-12-21 RX ADMIN — CEFTRIAXONE SCH MLS/HR: 1 INJECTION, SOLUTION INTRAVENOUS at 12:30

## 2018-12-21 RX ADMIN — CLONIDINE HYDROCHLORIDE SCH MG: 0.1 TABLET ORAL at 21:47

## 2018-12-21 NOTE — XRAY
Indication: Short of breath, cough, and congestion 1 week.



Comparison: December 19, 2018.



PA/lateral chest unchanged again demonstrating mild bibasilar infiltrates

versus atelectasis and fluid distended hiatal hernia.  Heart and mediastinal

structures within normal limits.  No new cardiopulmonary abnormalities.
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
## 2018-12-22 RX ADMIN — AZITHROMYCIN DIHYDRATE SCH MLS/HR: 500 INJECTION, POWDER, LYOPHILIZED, FOR SOLUTION INTRAVENOUS at 12:05

## 2018-12-22 RX ADMIN — CEFTRIAXONE SCH MLS/HR: 1 INJECTION, SOLUTION INTRAVENOUS at 10:56

## 2018-12-22 RX ADMIN — CLONIDINE HYDROCHLORIDE SCH MG: 0.1 TABLET ORAL at 21:22

## 2018-12-22 RX ADMIN — CLONIDINE HYDROCHLORIDE SCH MG: 0.1 TABLET ORAL at 10:57

## 2018-12-22 RX ADMIN — GUAIFENESIN SCH MG: 600 TABLET, EXTENDED RELEASE ORAL at 10:56

## 2018-12-22 RX ADMIN — AMLODIPINE BESYLATE SCH MG: 5 TABLET ORAL at 10:56

## 2018-12-22 RX ADMIN — HYDROCHLOROTHIAZIDE SCH MG: 25 TABLET ORAL at 10:55

## 2018-12-22 RX ADMIN — HYDROCODONE POLISTIREX AND CHLORPHENIRAMINE POLISITREX PRN ML: 10; 8 SUSPENSION, EXTENDED RELEASE ORAL at 10:57

## 2018-12-22 RX ADMIN — GUAIFENESIN SCH MG: 600 TABLET, EXTENDED RELEASE ORAL at 21:22

## 2018-12-22 NOTE — PCM.NOTE
Date and Time: 12/22/18  1040





Subjective Assessment: 





Pt is feeling better.  Cough is decreased.  BP elevated during this stay; when 

she has seen her specialists or been in my office it has been controlled, per 

pt.





- Review of Systems


Constitutional: No Fever


Respiratory: Cough





Objective Exam


General Appearance: no apparent distress, alert


Neurologic Exam: oriented x 3, cooperative


Skin Exam: normal color, warm, dry, No rash


Respiratory Exam: normal breath sounds, lungs clear, No crackles/rales, No 

rhonchi, No wheezing


Cardiovascular Exam: regular rate/rhythm, normal heart sounds, No murmur


Gastrointestinal/Abdomen Exam: normal bowel sounds, No distention





OBJECTIVE DATA


Vital Signs: 


 Vital Signs - 24 hr











  Temp Pulse Resp BP Pulse Ox


 


 12/22/18 08:00    18  


 


 12/22/18 07:44  98.7 F  63  18  168/79  97


 


 12/22/18 07:25   64  18   94 L


 


 12/22/18 04:07  98.2 F  66  18  134/76  96


 


 12/22/18 00:23  98.5 F  71  16  152/63  97


 


 12/22/18 00:00    16  


 


 12/21/18 20:00    18  


 


 12/21/18 19:49  98.2 F  73  18  177/80  97


 


 12/21/18 19:20   68  16   97


 


 12/21/18 16:52  98 F  67  20  131/83  95


 


 12/21/18 16:00  98 F  83  22  150/96  97


 


 12/21/18 14:00      99








 Pain Assessment - Last Documented











Pain Intensity                 0


 


Pain Scale Used                0-10 Pain Scale











Intake and Output: 


 Intake & Output











 12/19/18 12/20/18 12/21/18 12/22/18





 11:59 11:59 11:59 11:59


 


Intake Total    4086


 


Output Total    4450


 


Balance    -364


 


Weight   71 kg 











Lab Results: 


 Lab Results-Last 24 Hours











  12/21/18 12/21/18 12/21/18 Range/Units





  11:45 11:45 15:21 


 


WBC  6.1    (4.0-10.5)  K/mm3


 


RBC  4.81    (4.1-5.4)  M/mm3


 


Hgb  15.1    (12.0-16.0)  gm/dl


 


Hct  47.4 H    (35-47)  %


 


MCV  98.5    ()  fl


 


MCH  31.4    (26-32)  pg


 


MCHC  31.9 L    (32-36)  g/dl


 


RDW  12.3    (11.5-14.0)  %


 


Plt Count  228    (150-450)  K/mm3


 


MPV  9.8 H    (6-9.5)  fl


 


Gran %  70.5 H    (36.0-66.0)  %


 


Eos # (Auto)  0.15    (0-0.5)  


 


Absolute Lymphs (auto)  0.85 L    (1.0-4.6)  


 


Absolute Monos (auto)  0.77    (0.0-1.3)  


 


Lymphocytes %  14.0 L    (24.0-44.0)  %


 


Monocytes %  12.7 H    (0.0-12.0)  %


 


Eosinophils %  2.5    (0.00-5.0)  %


 


Basophils %  0.3    (0.0-0.4)  %


 


Absolute Granulocytes  4.27    (1.4-6.9)  


 


Basophils #  0.02    (0-0.4)  


 


Sodium   140   (137-145)  mmol/L


 


Potassium   3.9   (3.5-5.1)  mmol/L


 


Chloride   105   ()  mmol/L


 


Carbon Dioxide   27   (22-30)  mmol/L


 


Anion Gap   11.5   (5-15)  MEQ/L


 


BUN   15   (7-17)  mg/dL


 


Creatinine   0.84   (0.52-1.04)  mg/dL


 


Estimated GFR   > 60.0   ML/MIN


 


Glucose   121 H   ()  mg/dL


 


Calcium   9.6   (8.4-10.2)  mg/dL


 


Total Bilirubin   0.50   (0.2-1.3)  mg/dL


 


AST   26   (14-36)  U/L


 


ALT   17   (0-35)  U/L


 


Alkaline Phosphatase   94   ()  U/L


 


Serum Total Protein   7.6   (6.3-8.2)  g/dL


 


Albumin   3.9   (3.5-5.0)  g/dL


 


Amylase   76   ()  U/L


 


Lipase   70   ()  U/L


 


Urine Color    STRAW  (YELLOW)  


 


Urine Appearance    CLEAR  (CLEAR)  


 


Urine pH    7.0  (5-6)  


 


Ur Specific Gravity    1.005  (1.005-1.025)  


 


Urine Protein    NEGATIVE  (Negative)  


 


Urine Ketones    NEGATIVE  (NEGATIVE)  


 


Urine Blood    SMALL  (0-5)  Yasir/ul


 


Urine Nitrite    NEGATIVE  (NEGATIVE)  


 


Urine Bilirubin    NEGATIVE  (NEGATIVE)  


 


Urine Urobilinogen    NEGATIVE  (0-1)  mg/dL


 


Ur Leukocyte Esterase    NEGATIVE  (NEGATIVE)  


 


Urine WBC (Auto)    NONE  (0-5)  /HPF


 


Urine RBC (Auto)    NONE  (0-2)  /HPF


 


U Epithel Cells (Auto)    NONE  (FEW)  /HPF


 


Urine Bacteria (Auto)    NONE SEEN  (NEGATIVE)  /HPF


 


Urine Mucus (Auto)    SLIGHT  (NEGATIVE)  /HPF


 


Urine Culture Reflexed    NO  (NO)  


 


Urine Glucose    NEGATIVE  (NEGATIVE)  mg/dL











Radiology Exams: 


 Radiology Procedures











 Category Date Time Status


 


 CHEST 2 VIEWS (PA AND LAT) Routine Exams  12/21/18 11:35 Completed














Assessment/Plan


(1) Pneumonia


Current Visit: No   Status: Acute   Onset Date: ~12/21/18   


Assessment & Plan: 


Doing much better.  Plan to d/c home tomorrow.


Code(s): J18.9 - PNEUMONIA, UNSPECIFIED ORGANISM   





(2) Abdominal pain


Current Visit: No   Status: Resolved   


Qualifiers: 


   Abdominal location: right lower quadrant   Qualified Code(s): R10.31 - Right 

lower quadrant pain   


Code(s): R10.9 - UNSPECIFIED ABDOMINAL PAIN   





(3) Myelodysplastic syndrome


Current Visit: No   Status: Chronic   Code(s): D46.9 - MYELODYSPLASTIC SYNDROME

, UNSPECIFIED

## 2018-12-23 VITALS — OXYGEN SATURATION: 95 % | SYSTOLIC BLOOD PRESSURE: 104 MMHG | HEART RATE: 70 BPM | DIASTOLIC BLOOD PRESSURE: 57 MMHG

## 2018-12-23 RX ADMIN — AZITHROMYCIN DIHYDRATE SCH MLS/HR: 500 INJECTION, POWDER, LYOPHILIZED, FOR SOLUTION INTRAVENOUS at 11:16

## 2018-12-23 RX ADMIN — AZITHROMYCIN DIHYDRATE SCH: 500 INJECTION, POWDER, LYOPHILIZED, FOR SOLUTION INTRAVENOUS at 12:14

## 2018-12-23 RX ADMIN — AMLODIPINE BESYLATE SCH MG: 5 TABLET ORAL at 09:41

## 2018-12-23 RX ADMIN — AMLODIPINE BESYLATE SCH MG: 5 TABLET ORAL at 09:40

## 2018-12-23 RX ADMIN — HYDROCHLOROTHIAZIDE SCH MG: 25 TABLET ORAL at 09:40

## 2018-12-23 RX ADMIN — GUAIFENESIN SCH MG: 600 TABLET, EXTENDED RELEASE ORAL at 09:40

## 2018-12-23 RX ADMIN — CLONIDINE HYDROCHLORIDE SCH MG: 0.1 TABLET ORAL at 09:41

## 2018-12-23 RX ADMIN — CEFTRIAXONE SCH MLS/HR: 1 INJECTION, SOLUTION INTRAVENOUS at 09:40

## 2018-12-23 RX ADMIN — HYDROCODONE POLISTIREX AND CHLORPHENIRAMINE POLISITREX PRN ML: 10; 8 SUSPENSION, EXTENDED RELEASE ORAL at 09:39

## 2018-12-23 NOTE — PCM.DS
Discharge Summary


Date of Admission: 


12/21/18 11:10





Admitting Physician: 


BIRD RICHARDSON





Primary Care Provider: 


BIRD RICHARDSON








Allergies


Allergies





No Known Drug Allergies Allergy (Verified 01/02/18 19:36)


 











Hospital Summary





- Hospital Course


Hospital Course: 





Pt is 77 yo female pt of mine with myelodysplastic syndrome from [facility] who was 

being treated outpatient for pneumonia but continued to feel worse and had some 

abd pain due to coughing.  She was admitted to [facility] for IV rocephin and 

zithromax.  She has been feeling better every day and is feeling pretty good 

today.  She was started on lisinopril/HCTZ instead of lisinopril due to some 

elevated blood pressures. 


Home on po augmentin.





- Vitals & Intake/Output


Vital Signs: 





 Vital Signs











Temperature  97.5 F   12/23/18 08:00


 


Pulse Rate  68   12/23/18 08:00


 


Respiratory Rate  16   12/23/18 08:00


 


Blood Pressure  146/78   12/23/18 08:00


 


O2 Sat by Pulse Oximetry  97   12/23/18 08:00











Intake & Output: 





 Intake & Output











 12/20/18 12/21/18 12/22/18 12/23/18





 11:59 11:59 11:59 11:59


 


Intake Total   4086 3277


 


Output Total   4450 1950


 


Balance   -364 1327


 


Weight  71 kg  














- Lab


Result Diagrams: 


 12/21/18 11:45





 12/21/18 11:45





- Radiology Exams


Ordered Rad Exams-Entire Visit: 





 Radiology Procedures











 Category Date Time Status


 


 CHEST 2 VIEWS (PA AND LAT) Routine Exams  12/21/18 11:35 Completed














- Procedures and Test


Procedures and Tests throughout Hospitalization: 





 Therapy Orders & Screens





12/21/18 11:30


Respiratory Nebulizer Q4H 


   Comment: alb every 4 hours prn


   Diagnosis: PNE





12/21/18 14:00


Respiratory Therapy Assessment DAILY 


   Comment: 


   Diagnosis: failed outpatient pneumonia














Discharge Exam


General Appearance: no apparent distress, alert


Neurologic Exam: oriented x 3, cooperative


Skin Exam: normal color, warm, dry, No rash


Ears, Nose, Throat Exam: moist mucous membranes


Neck Exam: normal inspection, non-tender, No lymphadenopathy


Respiratory Exam: normal breath sounds, lungs clear, No crackles/rales, No 

rhonchi, No wheezing


Cardiovascular Exam: regular rate/rhythm, normal heart sounds, No murmur


Extremity Exam: normal inspection, No pedal edema, No swelling


Back Exam: normal inspection, No rash





Final Diagnosis/Problem List





- Final Discharge Diagnosis/Problem


(1) Pneumonia


Current Visit: No   Status: Acute   Onset Date: ~12/21/18   


Assessment & Plan: 


Doing great. Home on po augmentin x 7d.








(2) Abdominal pain


Current Visit: No   Status: Resolved   





(3) Myelodysplastic syndrome


Current Visit: No   Status: Chronic   





- Discharge


Disposition: Home, Self-Care


Condition: Good


Prescriptions: 


New


   Lisinopril/Hydrochlorothiazide [Lisinopril-Hctz 20-12.5 mg Tab] 1 each PO 

DAILY #30 tablet


   Guaifenesin 600 mg ER*** [Mucinex 600MG ER Tabs***] 600 mg PO BID #30 tablet


   Amoxicillin/Potassium Clav [Augmentin 875-125 Tablet] 875 mg PO BID #14 

tablet





Continue


   Clonidine HCl 0.1 mg*** [Catapres 0.1 MG***] 0.1 mg PO BID


   Amlodipine Besylate 5 mg*** [Norvasc 5 mg***] 5 mg PO DAILY





Discontinued


   Lisinopril 20 mg*** [Zestril 20 MG***] 20 mg PO DAILY #30 tablet


Follow up with: 


BIRD RICHARDSON [Primary Care Provider] - 1 Week

## 2021-02-11 ENCOUNTER — HOSPITAL ENCOUNTER (OUTPATIENT)
Dept: HOSPITAL 33 - ED | Age: 81
Setting detail: OBSERVATION
LOS: 1 days | Discharge: HOME | End: 2021-02-12
Attending: FAMILY MEDICINE | Admitting: FAMILY MEDICINE
Payer: MEDICARE

## 2021-02-11 DIAGNOSIS — I82.432: Primary | ICD-10-CM

## 2021-02-11 DIAGNOSIS — Z79.899: ICD-10-CM

## 2021-02-11 DIAGNOSIS — M79.89: ICD-10-CM

## 2021-02-11 DIAGNOSIS — I82.442: ICD-10-CM

## 2021-02-11 DIAGNOSIS — I10: ICD-10-CM

## 2021-02-11 LAB
ALBUMIN SERPL-MCNC: 4.1 G/DL (ref 3.5–5)
ALP SERPL-CCNC: 75 U/L (ref 38–126)
ALT SERPL-CCNC: 12 U/L (ref 0–35)
ANION GAP SERPL CALC-SCNC: 10.2 MEQ/L (ref 5–15)
AST SERPL QL: 24 U/L (ref 14–36)
BASOPHILS # BLD AUTO: 0.02 10*3/UL (ref 0–0.4)
BASOPHILS NFR BLD AUTO: 0.3 % (ref 0–0.4)
BILIRUB BLD-MCNC: 0.3 MG/DL (ref 0.2–1.3)
BNP SERPL-MCNC: 206 PG/ML (ref 0–1800)
BUN SERPL-MCNC: 20 MG/DL (ref 7–17)
CALCIUM SPEC-MCNC: 10.1 MG/DL (ref 8.4–10.2)
CHLORIDE SERPL-SCNC: 100 MMOL/L (ref 98–107)
CO2 SERPL-SCNC: 29 MMOL/L (ref 22–30)
CREAT SERPL-MCNC: 1.13 MG/DL (ref 0.52–1.04)
EOSINOPHIL # BLD AUTO: 0.16 10*3/UL (ref 0–0.5)
GFR SERPLBLD BASED ON 1.73 SQ M-ARVRAT: 49.4 ML/MIN
GLUCOSE SERPL-MCNC: 107 MG/DL (ref 74–106)
GLUCOSE UR-MCNC: NEGATIVE MG/DL
HCT VFR BLD AUTO: 41.2 % (ref 35–47)
HGB BLD-MCNC: 12.6 GM/DL (ref 12–16)
LYMPHOCYTES # SPEC AUTO: 1.42 10*3/UL (ref 1–4.6)
MAGNESIUM SERPL-MCNC: 2 MG/DL (ref 1.6–2.3)
MCH RBC QN AUTO: 28.6 PG (ref 26–32)
MCHC RBC AUTO-ENTMCNC: 30.6 G/DL (ref 32–36)
MONOCYTES # BLD AUTO: 0.88 10*3/UL (ref 0–1.3)
PLATELET # BLD AUTO: 287 K/MM3 (ref 150–450)
POTASSIUM SERPLBLD-SCNC: 3.9 MMOL/L (ref 3.5–5.1)
PROT SERPL-MCNC: 7.8 G/DL (ref 6.3–8.2)
PROT UR STRIP-MCNC: NEGATIVE MG/DL
RBC # BLD AUTO: 4.41 M/MM3 (ref 4.1–5.4)
RBC #/AREA URNS HPF: (no result) /HPF (ref 0–2)
SODIUM SERPL-SCNC: 135 MMOL/L (ref 137–145)
WBC # BLD AUTO: 7.1 K/MM3 (ref 4–10.5)
WBC #/AREA URNS HPF: (no result) /HPF (ref 0–5)

## 2021-02-11 PROCEDURE — 83880 ASSAY OF NATRIURETIC PEPTIDE: CPT

## 2021-02-11 PROCEDURE — 80053 COMPREHEN METABOLIC PANEL: CPT

## 2021-02-11 PROCEDURE — 93041 RHYTHM ECG TRACING: CPT

## 2021-02-11 PROCEDURE — 94760 N-INVAS EAR/PLS OXIMETRY 1: CPT

## 2021-02-11 PROCEDURE — 85025 COMPLETE CBC W/AUTO DIFF WBC: CPT

## 2021-02-11 PROCEDURE — 81001 URINALYSIS AUTO W/SCOPE: CPT

## 2021-02-11 PROCEDURE — G0378 HOSPITAL OBSERVATION PER HR: HCPCS

## 2021-02-11 PROCEDURE — 93971 EXTREMITY STUDY: CPT

## 2021-02-11 PROCEDURE — 75635 CT ANGIO ABDOMINAL ARTERIES: CPT

## 2021-02-11 PROCEDURE — 82550 ASSAY OF CK (CPK): CPT

## 2021-02-11 PROCEDURE — 73590 X-RAY EXAM OF LOWER LEG: CPT

## 2021-02-11 PROCEDURE — 84484 ASSAY OF TROPONIN QUANT: CPT

## 2021-02-11 PROCEDURE — 36415 COLL VENOUS BLD VENIPUNCTURE: CPT

## 2021-02-11 PROCEDURE — 36000 PLACE NEEDLE IN VEIN: CPT

## 2021-02-11 PROCEDURE — 93268 ECG RECORD/REVIEW: CPT

## 2021-02-11 PROCEDURE — 83735 ASSAY OF MAGNESIUM: CPT

## 2021-02-11 PROCEDURE — 96372 THER/PROPH/DIAG INJ SC/IM: CPT

## 2021-02-11 PROCEDURE — 99284 EMERGENCY DEPT VISIT MOD MDM: CPT
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
## 2021-02-12 VITALS — HEART RATE: 78 BPM | OXYGEN SATURATION: 99 % | DIASTOLIC BLOOD PRESSURE: 66 MMHG | SYSTOLIC BLOOD PRESSURE: 120 MMHG

## 2021-02-12 LAB
ALBUMIN SERPL-MCNC: 3.6 G/DL (ref 3.5–5)
ALP SERPL-CCNC: 70 U/L (ref 38–126)
ALT SERPL-CCNC: 10 U/L (ref 0–35)
ANION GAP SERPL CALC-SCNC: 9.7 MEQ/L (ref 5–15)
AST SERPL QL: 24 U/L (ref 14–36)
BASOPHILS # BLD AUTO: 0.01 10*3/UL (ref 0–0.4)
BASOPHILS NFR BLD AUTO: 0.2 % (ref 0–0.4)
BILIRUB BLD-MCNC: 0.3 MG/DL (ref 0.2–1.3)
BUN SERPL-MCNC: 19 MG/DL (ref 7–17)
CALCIUM SPEC-MCNC: 9.6 MG/DL (ref 8.4–10.2)
CHLORIDE SERPL-SCNC: 101 MMOL/L (ref 98–107)
CO2 SERPL-SCNC: 28 MMOL/L (ref 22–30)
CREAT SERPL-MCNC: 0.91 MG/DL (ref 0.52–1.04)
EOSINOPHIL # BLD AUTO: 0.12 10*3/UL (ref 0–0.5)
GFR SERPLBLD BASED ON 1.73 SQ M-ARVRAT: > 60 ML/MIN
GLUCOSE SERPL-MCNC: 99 MG/DL (ref 74–106)
HCT VFR BLD AUTO: 39.4 % (ref 35–47)
HGB BLD-MCNC: 12.1 GM/DL (ref 12–16)
LYMPHOCYTES # SPEC AUTO: 1.24 10*3/UL (ref 1–4.6)
MCH RBC QN AUTO: 28.7 PG (ref 26–32)
MCHC RBC AUTO-ENTMCNC: 30.7 G/DL (ref 32–36)
MONOCYTES # BLD AUTO: 0.67 10*3/UL (ref 0–1.3)
PLATELET # BLD AUTO: 241 K/MM3 (ref 150–450)
POTASSIUM SERPLBLD-SCNC: 3.9 MMOL/L (ref 3.5–5.1)
PROT SERPL-MCNC: 6.8 G/DL (ref 6.3–8.2)
RBC # BLD AUTO: 4.22 M/MM3 (ref 4.1–5.4)
SODIUM SERPL-SCNC: 136 MMOL/L (ref 137–145)
WBC # BLD AUTO: 5.1 K/MM3 (ref 4–10.5)

## 2021-02-12 RX ADMIN — CLONIDINE HYDROCHLORIDE SCH: 0.1 TABLET ORAL at 09:10

## 2021-02-12 RX ADMIN — CLONIDINE HYDROCHLORIDE SCH MG: 0.1 TABLET ORAL at 01:15

## 2021-02-12 NOTE — XRAY
Indication: Decreased pulse in lower leg.  Left leg DVT.



Conventional contrast enhanced CTA abdominal aorta with bilateral runoff

performed using 125 cc Isovue 370 contrast.  Two-dimensional sagittal and

coronal reformatted images obtained.  Additional 3-dimensional reformatted

images obtained using a separate workstation.



Comparison: None



Abdominal aorta demonstrates mild/moderate scattered arteriosclerotic disease

without aneurysm/dissection.  Celiac and superior mesenteric arteries

demonstrates arteriosclerotic calcifications at both origins without critical

stenosis, obstruction, or poststenotic dilatation.  Inferior mesenteric artery

normal in CTA appearance.  A single widely patent renal artery supplies each

kidney.



Right leg runoff demonstrates very minimal scattered calcifications in the

common iliac artery.  External iliac artery widely patent.  Minimal eccentric

calcifications in the distal common femoral artery without critical

stenosis/obstruction..  Deep femoral, superficial femoral, and popliteal

arteries are normal in CTA appearance.  Trifurcation arteries are also patent

with two-vessel runoff into the right foot.



Left leg runoff also demonstrates very minimal calcifications in the common

iliac and distal common femoral arteries.  External iliac artery, deep

femoral, and superficial femoral arteries are widely patent.  Popliteal artery

demonstrates very minimal calcifications without critical

stenosis/obstruction.  Trifurcation arteries are widely patent with

three-vessel runoff into the left foot.



Incidental CT findings include cardiomegaly, bibasilar pulmonary

atelectasis/scarring, large hiatal hernia with partial intrathoracic stomach,

2.5 cm gallstone with mild gallbladder wall thickening, bilateral peripelvic

renal cysts, and splenic calcified granulomas.  Noncontrasted stomach and

bowel loops appear nonobstructed with mild diffuse scattered colonic fecal

debris and sigmoid diverticulosis.  There has been hysterectomy.  No free

fluid/air.  Remaining liver, pancreas, adrenal glands, ureters, and bladder

are unremarkable.  No pathologic retroperitoneal lymphadenopathy.  Distal

rectus femoris muscle demonstrates small intramuscular lipomas, left greater

than right.



Osseous structures demonstrates osteopenia, mild/moderate multilevel

thoracolumbar degenerative spondylosis greatest at L5-S1, and T11/T12 Schmorl

nodes.



Impression:

1.  Mild/moderate arteriosclerotic disease of the abdominal aorta and major

branches as detailed.  Negative AAA or critical stenosis/obstruction.

2.  Left and right leg runoff demonstrates very minimal scattered

arteriosclerotic disease with two-vessel runoff right foot and three-vessel

runoff left foot.

3.  Large gallstone with gallbladder wall thickening.  Rule out cholecystitis.

4.  Incidental cardiomegaly, large hiatal hernia with partial intrathoracic

stomach, diffuse fecal stasis, colonic diverticulosis, bilateral renal cysts,

bilateral rectus femoris intramuscular lipomas, and chronic bony findings.

## 2021-02-12 NOTE — XRAY
Indication: Pain and swelling.



Two-dimensional sonogram and color Doppler imaging of the major venous vessels

of the left leg was performed.



Comparison: None



There are nonoccluding thrombi in the popliteal and posterior tibial.

Additional occluding thrombus seen in the greater saphenous vein at the level

of the calf.



No thrombus in the remaining visualized common femoral, deep femoral, and

femoral veins.  These patent veins demonstrate normal compressibility and

normal venous waveforms.



Impression: Occluding and nonoccluding DVTs as detailed.



Comment: Preliminary report was given.

## 2021-02-12 NOTE — XRAY
Indication: Lower leg swelling.



Comparison: February 20, 2019.



2 view left lower leg demonstrate stable mild knee degenerative changes and

minimal vascular calcifications.  No new/acute bony, articular, or soft tissue

abnormalities.
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
## 2021-06-02 ENCOUNTER — HOSPITAL ENCOUNTER (OUTPATIENT)
Dept: HOSPITAL 33 - SDC | Age: 81
Discharge: HOME | End: 2021-06-02
Attending: FAMILY MEDICINE
Payer: MEDICARE

## 2021-06-02 VITALS — SYSTOLIC BLOOD PRESSURE: 145 MMHG | DIASTOLIC BLOOD PRESSURE: 68 MMHG | HEART RATE: 67 BPM

## 2021-06-02 VITALS — OXYGEN SATURATION: 99 %

## 2021-06-02 DIAGNOSIS — D50.9: ICD-10-CM

## 2021-06-02 DIAGNOSIS — K29.70: ICD-10-CM

## 2021-06-02 DIAGNOSIS — I10: ICD-10-CM

## 2021-06-02 DIAGNOSIS — C18.0: Primary | ICD-10-CM

## 2021-06-02 PROCEDURE — 88305 TISSUE EXAM BY PATHOLOGIST: CPT

## 2021-06-02 PROCEDURE — 99100 ANES PT EXTEME AGE<1 YR&>70: CPT

## 2021-06-02 NOTE — OP
SURGERY DATE/TIME:  06/02/2021 0741



PREOPERATIVE DIAGNOSIS:  Iron deficiency anemia.  



POSTOPERATIVE DIAGNOSES:    

1) Mild gastritis. 

2) Broad based cecal polyp unable to completely remove. 



PROCEDURES:    

1) EGD. 

2) Colonoscopy. 



SURGEON: Hammad Koehler M.D.



ANESTHESIA:  MAC by Alejandro Ariza CRNA. 



ESTIMATED BLOOD LOSS:  Minimal. 



SPECIMENS:  

1) Two cold forceps biopsies from the gastric antrum.           

2) Biopsy of cecal polyp.



DESCRIPTION OF PROCEDURE:  After informed written consent was obtained, the patient was 
taken to the endoscopy suite. She was placed in left lateral decubitus position and a bite 
block inserted. Anesthesia was titrated to desired level of consciousness and the 
endoscope inserted in the posterior oropharynx. Under direct visualization the esophagus 
was traversed. There was some mild narrowing at the gastroesophageal junction. No obvious 
lesions or mass is present. Upon entering the stomach there was normal rugated gastric 
mucosa. There were some mild gastritis-type changes in the gastric antrum. First and 
second portions of the duodenum were within normal limits upon traversing the pylorus. Two 
cold forceps biopsies were taken from the gastric antrum and sent for Helicobacter pylori 
testing. Upon withdrawal no other obvious lesions were encountered. The scope was removed 
and the scopes were switched.  



Digital rectal exam showed normal sphincter tone and no internal lesions. The scope was 
inserted in the rectum and sequentially the entire colonic mucosa was traversed. The level 
of the cecum was reached and verified with direct visualization of the ileocecal valve. 
There was a large broad based polypoid mass-like lesion in the cecum. It was associated 
with the colon wall circumferentially. Attempted to snare the lesion but unable to get a 
snare around it. The lesion was very friable and some mild bleeding present upon 
encountering it. Forceps biopsy was then used and it was biopsied in several places but 
again it was so friable I was unable to get a meaningful manipulation of the lesion to be 
able to remove it in its entirety. Representative biopsy samples were taken and multiple 
pictures were taken. It was certainly in the cecum. Upon withdrawal there were scattered 
diverticula in the sigmoid colon. No other lesions were encountered. Prior to withdrawal 
retroflexion showed no internal lesions. The scope was then removed and the patient was 
transferred to the recovery room. I have discussed the inability to remove this lesion 
with the patient's son, reviewed pictures. I will refer her to general surgery with 
biopsies pending at this time. I have advised that she hold her Eliquis for the next week 
to allow healing of the biopsy sites until further surgical plan is considered.

## 2021-06-18 NOTE — HP
DATE OF SURGERY:  06/24/2021



HISTORY OF PRESENT ILLNESS:  The patient is a 80 year-old female who presents to us with a 
biopsy confirmed cecal adenocarcinoma. She had been scoped recently by Dr. Koehler or Dr. Breen and found to have a large polyp in the cecum. She had initially presented with 
some anemia and fatigue. Incidentally, the patient also has cholelithiasis and is somewhat 
symptomatic. 



PAST MEDICAL HISTORY:  Hypertension. Deep vein thrombosis. Raynaud's syndrome. 



PAST SURGICAL HISTORY:  Back surgery x4. Partial hysterectomy, unsure if she has ovaries. 
Bladder lift. 



ALLERGIES:  HYDROMORPHONE.  TAPE.



MEDICATIONS:   Amlodipine. Vitamin B12. Eliquis for deep vein thrombosis. Clonidine. 



FAMILY HISTORY:  Lung cancer. Liver cancer. 



SOCIAL HISTORY: None. 

REVIEW OF SYSTEMS: CONSTITUTIONAL:  Denies fever or chills. CHEST: Denies shortness of 
breath. CVS: Denies chest pain. ABDOMEN: Denies abdominal pain, nausea, vomiting, 
diarrhea, constipation or rectal bleeding.  



PHYSICAL EXAMINATION:  

GENERAL:  No acute distress. 

CHEST: Nonlabored. No shortness of breath. 

CVS:  Regular rate and rhythm.

ABDOMEN: Soft, nontender. 

EXTREMITIES: No edema. 

NEUROLOGIC: Alert.

PSYCHIATRIC: Appropriate. 



IMPRESSION:  Cecal adenocarcinoma and symptomatic cholelithiasis. 



PLAN: Laparoscopic right hemicolectomy with possible cholecystectomy with Dr. Caesar Beltran. 



As dictated by Deya Agee NP.

## 2021-06-22 LAB — BLD GP AB AUTOL SCN SERPL-IMP: (no result)

## 2021-06-24 ENCOUNTER — HOSPITAL ENCOUNTER (INPATIENT)
Dept: HOSPITAL 33 - MED SURG | Age: 81
LOS: 5 days | Discharge: HOME | DRG: 330 | End: 2021-06-29
Attending: SURGERY | Admitting: SURGERY
Payer: MEDICARE

## 2021-06-24 DIAGNOSIS — I73.00: ICD-10-CM

## 2021-06-24 DIAGNOSIS — C18.0: Primary | ICD-10-CM

## 2021-06-24 DIAGNOSIS — Z86.718: ICD-10-CM

## 2021-06-24 DIAGNOSIS — Z79.01: ICD-10-CM

## 2021-06-24 DIAGNOSIS — D68.51: ICD-10-CM

## 2021-06-24 DIAGNOSIS — D46.9: ICD-10-CM

## 2021-06-24 DIAGNOSIS — I10: ICD-10-CM

## 2021-06-24 DIAGNOSIS — K80.20: ICD-10-CM

## 2021-06-24 DIAGNOSIS — Z79.899: ICD-10-CM

## 2021-06-24 DIAGNOSIS — Z20.828: ICD-10-CM

## 2021-06-24 LAB
GLUCOSE UR-MCNC: NEGATIVE MG/DL
PROT UR STRIP-MCNC: NEGATIVE MG/DL
RBC #/AREA URNS HPF: (no result) /HPF (ref 0–2)
WBC #/AREA URNS HPF: (no result) /HPF (ref 0–5)

## 2021-06-24 PROCEDURE — 85027 COMPLETE CBC AUTOMATED: CPT

## 2021-06-24 PROCEDURE — 88342 IMHCHEM/IMCYTCHM 1ST ANTB: CPT

## 2021-06-24 PROCEDURE — 88309 TISSUE EXAM BY PATHOLOGIST: CPT

## 2021-06-24 PROCEDURE — 88304 TISSUE EXAM BY PATHOLOGIST: CPT

## 2021-06-24 PROCEDURE — 36415 COLL VENOUS BLD VENIPUNCTURE: CPT

## 2021-06-24 PROCEDURE — 80048 BASIC METABOLIC PNL TOTAL CA: CPT

## 2021-06-24 PROCEDURE — 47562 LAPAROSCOPIC CHOLECYSTECTOMY: CPT

## 2021-06-24 PROCEDURE — 99100 ANES PT EXTEME AGE<1 YR&>70: CPT

## 2021-06-24 PROCEDURE — 85025 COMPLETE CBC W/AUTO DIFF WBC: CPT

## 2021-06-24 PROCEDURE — 93005 ELECTROCARDIOGRAM TRACING: CPT

## 2021-06-24 PROCEDURE — 0DTF4ZZ RESECTION OF RIGHT LARGE INTESTINE, PERCUTANEOUS ENDOSCOPIC APPROACH: ICD-10-PCS | Performed by: SURGERY

## 2021-06-24 PROCEDURE — 88341 IMHCHEM/IMCYTCHM EA ADD ANTB: CPT

## 2021-06-24 PROCEDURE — 44204 LAPARO PARTIAL COLECTOMY: CPT

## 2021-06-24 PROCEDURE — 86870 RBC ANTIBODY IDENTIFICATION: CPT

## 2021-06-24 PROCEDURE — 86922 COMPATIBILITY TEST ANTIGLOB: CPT

## 2021-06-24 PROCEDURE — 0FT44ZZ RESECTION OF GALLBLADDER, PERCUTANEOUS ENDOSCOPIC APPROACH: ICD-10-PCS | Performed by: SURGERY

## 2021-06-24 PROCEDURE — U0003 INFECTIOUS AGENT DETECTION BY NUCLEIC ACID (DNA OR RNA); SEVERE ACUTE RESPIRATORY SYNDROME CORONAVIRUS 2 (SARS-COV-2) (CORONAVIRUS DISEASE [COVID-19]), AMPLIFIED PROBE TECHNIQUE, MAKING USE OF HIGH THROUGHPUT TECHNOLOGIES AS DESCRIBED BY CMS-2020-01-R: HCPCS

## 2021-06-24 PROCEDURE — 81001 URINALYSIS AUTO W/SCOPE: CPT

## 2021-06-24 PROCEDURE — 87086 URINE CULTURE/COLONY COUNT: CPT

## 2021-06-24 PROCEDURE — 94760 N-INVAS EAR/PLS OXIMETRY 1: CPT

## 2021-06-24 PROCEDURE — 80053 COMPREHEN METABOLIC PANEL: CPT

## 2021-06-24 PROCEDURE — 86850 RBC ANTIBODY SCREEN: CPT

## 2021-06-24 PROCEDURE — 86900 BLOOD TYPING SEROLOGIC ABO: CPT

## 2021-06-24 PROCEDURE — 86901 BLOOD TYPING SEROLOGIC RH(D): CPT

## 2021-06-24 RX ADMIN — POTASSIUM CHLORIDE, DEXTROSE MONOHYDRATE AND SODIUM CHLORIDE SCH MLS/HR: 150; 5; 450 INJECTION, SOLUTION INTRAVENOUS at 20:44

## 2021-06-24 RX ADMIN — CEFOXITIN SODIUM SCH MLS/HR: 2 INJECTION, SOLUTION INTRAVENOUS at 20:02

## 2021-06-24 RX ADMIN — CLONIDINE HYDROCHLORIDE SCH MG: 0.1 TABLET ORAL at 22:36

## 2021-06-24 RX ADMIN — MORPHINE SULFATE PRN MG: 2 INJECTION, SOLUTION INTRAMUSCULAR; INTRAVENOUS at 19:58

## 2021-06-24 RX ADMIN — ALVIMOPAN SCH MG: 12 CAPSULE ORAL at 22:36

## 2021-06-25 LAB
ANION GAP SERPL CALC-SCNC: 11.6 MEQ/L (ref 5–15)
BUN SERPL-MCNC: 9 MG/DL (ref 7–17)
CALCIUM SPEC-MCNC: 8.7 MG/DL (ref 8.4–10.2)
CHLORIDE SERPL-SCNC: 101 MMOL/L (ref 98–107)
CO2 SERPL-SCNC: 23 MMOL/L (ref 22–30)
CREAT SERPL-MCNC: 0.77 MG/DL (ref 0.52–1.04)
GFR SERPLBLD BASED ON 1.73 SQ M-ARVRAT: > 60 ML/MIN
GLUCOSE SERPL-MCNC: 169 MG/DL (ref 74–106)
HCT VFR BLD AUTO: 38 % (ref 35–47)
HGB BLD-MCNC: 11.6 GM/DL (ref 12–16)
MCH RBC QN AUTO: 29.3 PG (ref 26–32)
MCHC RBC AUTO-ENTMCNC: 30.5 G/DL (ref 32–36)
PLATELET # BLD AUTO: 220 K/MM3 (ref 150–450)
POTASSIUM SERPLBLD-SCNC: 4.4 MMOL/L (ref 3.5–5.1)
RBC # BLD AUTO: 3.96 M/MM3 (ref 4.1–5.4)
SODIUM SERPL-SCNC: 132 MMOL/L (ref 137–145)
WBC # BLD AUTO: 7.9 K/MM3 (ref 4–10.5)

## 2021-06-25 RX ADMIN — ALVIMOPAN SCH MG: 12 CAPSULE ORAL at 21:01

## 2021-06-25 RX ADMIN — CEFOXITIN SODIUM SCH MLS/HR: 2 INJECTION, SOLUTION INTRAVENOUS at 17:26

## 2021-06-25 RX ADMIN — HYDROCODONE BITARTRATE AND ACETAMINOPHEN PRN TAB: 5; 325 TABLET ORAL at 17:25

## 2021-06-25 RX ADMIN — CEFOXITIN SODIUM SCH MLS/HR: 2 INJECTION, SOLUTION INTRAVENOUS at 05:37

## 2021-06-25 RX ADMIN — CLONIDINE HYDROCHLORIDE SCH MG: 0.1 TABLET ORAL at 09:16

## 2021-06-25 RX ADMIN — ALVIMOPAN SCH MG: 12 CAPSULE ORAL at 09:16

## 2021-06-25 RX ADMIN — POTASSIUM CHLORIDE, DEXTROSE MONOHYDRATE AND SODIUM CHLORIDE SCH: 150; 5; 450 INJECTION, SOLUTION INTRAVENOUS at 07:34

## 2021-06-25 RX ADMIN — ENOXAPARIN SODIUM SCH: 100 INJECTION SUBCUTANEOUS at 09:13

## 2021-06-25 RX ADMIN — AMLODIPINE BESYLATE SCH MG: 5 TABLET ORAL at 09:16

## 2021-06-25 RX ADMIN — ENOXAPARIN SODIUM SCH MG: 100 INJECTION SUBCUTANEOUS at 05:38

## 2021-06-25 RX ADMIN — MORPHINE SULFATE PRN MG: 2 INJECTION, SOLUTION INTRAMUSCULAR; INTRAVENOUS at 13:25

## 2021-06-25 RX ADMIN — CEFOXITIN SODIUM SCH MLS/HR: 2 INJECTION, SOLUTION INTRAVENOUS at 00:58

## 2021-06-25 RX ADMIN — CEFOXITIN SODIUM SCH MLS/HR: 2 INJECTION, SOLUTION INTRAVENOUS at 11:30

## 2021-06-25 RX ADMIN — POTASSIUM CHLORIDE, DEXTROSE MONOHYDRATE AND SODIUM CHLORIDE SCH MLS/HR: 150; 5; 450 INJECTION, SOLUTION INTRAVENOUS at 17:26

## 2021-06-25 RX ADMIN — CEFOXITIN SODIUM SCH MLS/HR: 2 INJECTION, SOLUTION INTRAVENOUS at 23:03

## 2021-06-25 RX ADMIN — CLONIDINE HYDROCHLORIDE SCH MG: 0.1 TABLET ORAL at 21:01

## 2021-06-25 RX ADMIN — POTASSIUM CHLORIDE, DEXTROSE MONOHYDRATE AND SODIUM CHLORIDE SCH MLS/HR: 150; 5; 450 INJECTION, SOLUTION INTRAVENOUS at 07:34

## 2021-06-25 RX ADMIN — HYDROCODONE BITARTRATE AND ACETAMINOPHEN PRN TAB: 5; 325 TABLET ORAL at 07:33

## 2021-06-25 RX ADMIN — HYDROCODONE BITARTRATE AND ACETAMINOPHEN PRN TAB: 5; 325 TABLET ORAL at 21:45
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
## 2021-06-25 NOTE — OP
SURGERY DATE:    06/24/2021



SURGERY TIME:    1445



PREOPERATIVE DIAGNOSIS: 

1.  RIGHT COLON CANCER.

2.  SYMPTOMATIC CHOLELITHIASIS.



POSTOPERATIVE DIAGNOSIS:

1.  RIGHT COLON CANCER.

2.  SYMPTOMATIC CHOLELITHIASIS.



PROCEDURE:    

1.  Laparoscopic right hemicolectomy.

2.  Laparoscopic cholecystectomy.



SURGEON:        Caesar Beltran M.D.



ANESTHESIA:    General endotracheal tube.



COMPLICATIONS:    None.



CONDITION:        Stable.



BLOOD LOSS:    100 cc.



DRAINS:    None.



INDICATION:    Patient has a new diagnosis of right colon cancer in the cecum. She is 
elderly. Procedure has been discussed. She preferred to have laparoscopic rather than 
open. She also had symptomatic cholelithiasis.



OPERATIVE PROCEDURE:     She was taken to the OR. General anesthetic. Routine prep and 
drape. Arms tucked. Time-out performed. A routine prep and drape. General anesthetic. 
Time-out performed. Three 5's were brought down the left side. The right colon was 
mobilized. The right external iliac identified. The right ureter identified. The right 
gutter had been opened and then had been mobilized medial. The clear window in the right 
upper quadrant was visible. It was entered. It was held up. About 4" of mesocolon was 
taken to the right towards the middle colic vessels. The omentum was transected off the 
greater curvature, about an inch off the greater curvature. Preparation for transection 4" 
down on the small bowel and just to the right mid colic vessels. This was then performed 
with endoscopic NACHO blue cartridges. The colon was totally loose. The ileocolic area was 
marked with a hook cautery. It was held up, kept away from the ureter, and it was 
transected with the ligature. This was up to the free window. The duodenal sweep had been 
well identified and protected posteriorly. There was nothing residual up in the upper 
quadrant at this time. The specimen was clear. It was placed up above the liver. The 
gallbladder was elevated upwards. Infundibulum dissected. Cystic duct defined. Cystic 
artery defined. Both structures triply Ligaclipped and transected. Clips totally cross 
wall approximated. Gallbladder rolled out of gallbladder fossa. Gallbladder delivered off 
the top edge of the liver. Field was dry. Preparation for anastomosis. There was already 
satisfactory mobilization. The end of the ileum and the end of the colon were picked up 
singly with a single Allis. Was brought to the extraction site in right upper quadrant. 4 
cm transverse incision was marked. It was anesthetized with 0.25% Marcaine. It was 
entered. The two ends pulled out. They were immediately remarked with 2 Allis on the 
non-anastomotic side. The single endoscopic was on the intended anastomotic area. Two 
small rents, one in the small bowel and one in the colon here. The NACHO was applied. It was 
fired. It was inspected. Hemostasis satisfactory. Length was excellent, viability 
excellent, laid nicely. The ends were picked up, elevated, a contour stapler was placed 
and fired. A good sealing off of the end was present. Mesentery defect was able to be 
approximately for 4". This basically closed this mesenteric defect. This was dropped back 
in the abdomen. The specimen was delivered. The anterior abdominal wall closed in 2 
layers, a 0 PDS posterior layer and a 0 PDS anterior layer. Subcutaneous tissue irrigated. 
Skin closed with staples. Hole closure device was used on the 12 port, was in the middle 
of the three left lateral ports, had to be changed up from a 5 to 12. This was visibly 
closed with 2 sutures of 0 Vicryl. Field was totally dry. Just a few drops of residual 
blood were suctioned. The anastomosis looked excellent and it laid nicely. There were no 
signs of any issues at all. CO2 was evacuated. Skin finished being closed with staples. 
Sterile dressing applied. Findings discussed with the family in the waiting room. Patient 
tolerated the procedure well.

## 2021-06-26 RX ADMIN — ALVIMOPAN SCH MG: 12 CAPSULE ORAL at 10:05

## 2021-06-26 RX ADMIN — AMLODIPINE BESYLATE SCH MG: 5 TABLET ORAL at 10:05

## 2021-06-26 RX ADMIN — CLONIDINE HYDROCHLORIDE SCH MG: 0.1 TABLET ORAL at 10:04

## 2021-06-26 RX ADMIN — HYDROCODONE BITARTRATE AND ACETAMINOPHEN PRN TAB: 5; 325 TABLET ORAL at 03:50

## 2021-06-26 RX ADMIN — CLONIDINE HYDROCHLORIDE SCH MG: 0.1 TABLET ORAL at 20:35

## 2021-06-26 RX ADMIN — POTASSIUM CHLORIDE, DEXTROSE MONOHYDRATE AND SODIUM CHLORIDE SCH MLS/HR: 150; 5; 450 INJECTION, SOLUTION INTRAVENOUS at 03:47

## 2021-06-26 RX ADMIN — HYDROCODONE BITARTRATE AND ACETAMINOPHEN PRN TAB: 5; 325 TABLET ORAL at 10:03

## 2021-06-26 RX ADMIN — HYDROCODONE BITARTRATE AND ACETAMINOPHEN PRN TAB: 5; 325 TABLET ORAL at 22:59

## 2021-06-26 RX ADMIN — APIXABAN SCH MG: 2.5 TABLET, FILM COATED ORAL at 10:04

## 2021-06-26 RX ADMIN — ALVIMOPAN SCH MG: 12 CAPSULE ORAL at 20:35

## 2021-06-26 RX ADMIN — APIXABAN SCH MG: 2.5 TABLET, FILM COATED ORAL at 20:35

## 2021-06-26 RX ADMIN — HYDROCODONE BITARTRATE AND ACETAMINOPHEN PRN TAB: 5; 325 TABLET ORAL at 17:44

## 2021-06-26 RX ADMIN — CEFOXITIN SODIUM SCH MLS/HR: 2 INJECTION, SOLUTION INTRAVENOUS at 05:06

## 2021-06-27 LAB
ALBUMIN SERPL-MCNC: 3.6 G/DL (ref 3.5–5)
ALP SERPL-CCNC: 66 U/L (ref 38–126)
ALT SERPL-CCNC: 23 U/L (ref 0–35)
ANION GAP SERPL CALC-SCNC: 9.2 MEQ/L (ref 5–15)
AST SERPL QL: 37 U/L (ref 14–36)
BASOPHILS # BLD AUTO: 0.02 10*3/UL (ref 0–0.4)
BASOPHILS NFR BLD AUTO: 0.3 % (ref 0–0.4)
BILIRUB BLD-MCNC: 0.4 MG/DL (ref 0.2–1.3)
BUN SERPL-MCNC: 7 MG/DL (ref 7–17)
CALCIUM SPEC-MCNC: 9.2 MG/DL (ref 8.4–10.2)
CHLORIDE SERPL-SCNC: 101 MMOL/L (ref 98–107)
CO2 SERPL-SCNC: 27 MMOL/L (ref 22–30)
CREAT SERPL-MCNC: 0.76 MG/DL (ref 0.52–1.04)
EOSINOPHIL # BLD AUTO: 0.2 10*3/UL (ref 0–0.5)
GFR SERPLBLD BASED ON 1.73 SQ M-ARVRAT: > 60 ML/MIN
GLUCOSE SERPL-MCNC: 94 MG/DL (ref 74–106)
HCT VFR BLD AUTO: 40.4 % (ref 35–47)
HGB BLD-MCNC: 12.2 GM/DL (ref 12–16)
LYMPHOCYTES # SPEC AUTO: 1.07 10*3/UL (ref 1–4.6)
MCH RBC QN AUTO: 29.4 PG (ref 26–32)
MCHC RBC AUTO-ENTMCNC: 30.2 G/DL (ref 32–36)
MONOCYTES # BLD AUTO: 0.9 10*3/UL (ref 0–1.3)
PLATELET # BLD AUTO: 223 K/MM3 (ref 150–450)
POTASSIUM SERPLBLD-SCNC: 3.9 MMOL/L (ref 3.5–5.1)
PROT SERPL-MCNC: 6.6 G/DL (ref 6.3–8.2)
RBC # BLD AUTO: 4.15 M/MM3 (ref 4.1–5.4)
SODIUM SERPL-SCNC: 134 MMOL/L (ref 137–145)
WBC # BLD AUTO: 6.3 K/MM3 (ref 4–10.5)

## 2021-06-27 RX ADMIN — HYDROCODONE BITARTRATE AND ACETAMINOPHEN PRN TAB: 5; 325 TABLET ORAL at 07:35

## 2021-06-27 RX ADMIN — HYDROCODONE BITARTRATE AND ACETAMINOPHEN PRN TAB: 5; 325 TABLET ORAL at 18:24

## 2021-06-27 RX ADMIN — AMLODIPINE BESYLATE SCH MG: 5 TABLET ORAL at 09:38

## 2021-06-27 RX ADMIN — APIXABAN SCH MG: 2.5 TABLET, FILM COATED ORAL at 09:38

## 2021-06-27 RX ADMIN — ALVIMOPAN SCH MG: 12 CAPSULE ORAL at 21:10

## 2021-06-27 RX ADMIN — CLONIDINE HYDROCHLORIDE SCH MG: 0.1 TABLET ORAL at 09:38

## 2021-06-27 RX ADMIN — CLONIDINE HYDROCHLORIDE SCH MG: 0.1 TABLET ORAL at 21:10

## 2021-06-27 RX ADMIN — APIXABAN SCH MG: 2.5 TABLET, FILM COATED ORAL at 21:10

## 2021-06-27 RX ADMIN — ALVIMOPAN SCH MG: 12 CAPSULE ORAL at 09:38

## 2021-06-27 RX ADMIN — HYDROCODONE BITARTRATE AND ACETAMINOPHEN PRN TAB: 5; 325 TABLET ORAL at 22:22

## 2021-06-28 RX ADMIN — APIXABAN SCH MG: 2.5 TABLET, FILM COATED ORAL at 22:13

## 2021-06-28 RX ADMIN — APIXABAN SCH MG: 2.5 TABLET, FILM COATED ORAL at 09:19

## 2021-06-28 RX ADMIN — ALVIMOPAN SCH MG: 12 CAPSULE ORAL at 09:19

## 2021-06-28 RX ADMIN — ALVIMOPAN SCH MG: 12 CAPSULE ORAL at 22:14

## 2021-06-28 RX ADMIN — AMLODIPINE BESYLATE SCH MG: 5 TABLET ORAL at 09:19

## 2021-06-28 RX ADMIN — HYDROCODONE BITARTRATE AND ACETAMINOPHEN PRN TAB: 5; 325 TABLET ORAL at 19:37

## 2021-06-28 RX ADMIN — CLONIDINE HYDROCHLORIDE SCH MG: 0.1 TABLET ORAL at 09:19

## 2021-06-28 RX ADMIN — CLONIDINE HYDROCHLORIDE SCH: 0.1 TABLET ORAL at 22:13

## 2021-06-28 NOTE — PCM.NOTE
Date and Time: 06/28/21 0833





Subjective Assessment: 





Pt is tolerating full liquids. Passed gas over the weekend.  Up out of bed 

walking in the higuera with assistance.





- Review of Systems


Constitutional: No Fever


Abdominal/Gastrointestinal: Abdominal Pain





Objective Exam


General Appearance: no apparent distress, alert


Neurologic Exam: oriented x 3, cooperative


Skin Exam: normal color, warm, dry, No rash


Wound Assessment: 


                              Skin/Wound Assessment





Wound/Incision Assessment                                  Start:  06/24/21 

20:00


Text:                                                      Status: Active       




Freq:   Q4H                                                                     




Protocol:                                                                       




 Document     06/28/21 04:00  GEOVANNI  (Rec: 06/28/21 04:07  GEOVANNI  [id number])


 Wound/Incision Assessment


     Abdomen


      Wound Assessment                           Shift Assessment


      Wound Type                                 Incision


      Wound Stage                                Non Pressure Wound


      Drainage Amount                            None


      Drainage Odor                              None/Absent


      General Appearance                         Well Approximated,Staples


                                                 Intact,Open to air


      Surrounding Tissue                         Pink


 Wound Photo


     Photo Taken                                 No








Eye Exam: eyes nml inspection


Ears, Nose, Throat Exam: moist mucous membranes


Neck Exam: normal inspection


Respiratory Exam: lungs clear, No crackles/rales, No rhonchi, No wheezing


Cardiovascular Exam: regular rate/rhythm, normal heart sounds, No murmur


Gastrointestinal/Abdomen Exam: soft, tenderness (epigastrum), other (post 

surgical wounds stapled; well approximated, clean/dry/intact.  some surrounding 

bruises), No normal bowel sounds (hypoactive but present), No distention, No 

mass, No guarding, No rebound


Extremity Exam: normal inspection, No pedal edema, No swelling


Back Exam: normal inspection, No rash





OBJECTIVE DATA


Vital Signs: 


                               Vital Signs - 24 hr











  Temp Pulse Resp BP Pulse Ox


 


 06/28/21 07:15  98.0 F  64  16  138/63  97


 


 06/28/21 04:00  97.7 F  59 L  18  122/67  97


 


 06/28/21 00:00    17  


 


 06/27/21 23:49  98.9 F  80  17  94/51  97


 


 06/27/21 19:33  98.1 F  76  16  136/69  94 L


 


 06/27/21 16:00  98.0 F  71  18  121/64  97


 


 06/27/21 12:00    16  


 


 06/27/21 11:41  98.1 F  77  16  100/61  97








                        Pain Assessment - Last Documented











Pain Intensity                 0


 


Pain Scale Used                0-10 Pain Scale











Intake and Output: 


                                 Intake & Output











 06/25/21 06/26/21 06/27/21 06/28/21





 11:59 11:59 11:59 11:59


 


Intake Total 771 2280 230 480


 


Output Total 950 2000 1500 300


 


Balance -179 280 -1270 180


 


Weight 69 kg   














Assessment/Plan


(1) S/P partial colectomy


Current Visit: Yes   Status: Acute   


Assessment & Plan: 


POD #4 - doing great.  Home when cleared by surgery, thank you.


Code(s): Z90.49 - ACQUIRED ABSENCE OF OTHER SPECIFIED PARTS OF DIGESTIVE TRACT  







(2) Heterozygous factor V Leiden mutation


Current Visit: Yes   Status: Chronic   


Assessment & Plan: 


On Eliquis.


Code(s): D68.51 - ACTIVATED PROTEIN C RESISTANCE   





(3) Myelodysplastic syndrome


Current Visit: No   Status: Chronic   Code(s): D46.9 - MYELODYSPLASTIC SYNDROME,

UNSPECIFIED   





(4) Anemia


Current Visit: Yes   Status: Acute   


Qualifiers: 


   Anemia type: iron deficiency   Iron deficiency anemia type: chronic blood 

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

(chronic)   


Assessment & Plan: 


Currently Hgb is wnl - would plan to not send home on Fe and keep observing.


Code(s): D64.9 - ANEMIA, UNSPECIFIED
normal

## 2021-06-29 VITALS — HEART RATE: 72 BPM | OXYGEN SATURATION: 98 % | DIASTOLIC BLOOD PRESSURE: 58 MMHG | SYSTOLIC BLOOD PRESSURE: 118 MMHG

## 2021-06-29 RX ADMIN — HYDROCODONE BITARTRATE AND ACETAMINOPHEN PRN TAB: 5; 325 TABLET ORAL at 10:22

## 2021-06-29 RX ADMIN — CLONIDINE HYDROCHLORIDE SCH MG: 0.1 TABLET ORAL at 10:09

## 2021-06-29 RX ADMIN — ALVIMOPAN SCH MG: 12 CAPSULE ORAL at 10:10

## 2021-06-29 RX ADMIN — AMLODIPINE BESYLATE SCH MG: 5 TABLET ORAL at 10:10

## 2021-06-29 RX ADMIN — APIXABAN SCH MG: 2.5 TABLET, FILM COATED ORAL at 10:09

## 2021-06-29 NOTE — PCM.DS
Discharge Summary


Date of Admission: 


06/24/21 10:33





Admitting Physician: 


GANESH HUTTON





Consults: 





                                Consults on Case





06/24/21 11:22


Notify Physician ROUTINE 











Primary Care Provider: 


BIRD TSANG








Allergies


Allergies





hydromorphone [From Dilaudid] Allergy (Intermediate, Verified 06/24/21 10:56)


   Itching


tape Adverse Reaction (Mild, Uncoded 06/02/21 06:13)


   Rash











Hospital Summary





- Hospital Course


Hospital Course: 





Pt is a 79 yo female pt of mine with myelodysplastic syndrome, Factor V Leiden 

heterozygote, and Reynaud's syndrome who was admitted for partial bowel 

resection with DR. FAITH Hutton after tumor was found on colonoscopy recently.  She 

has done well postoperatively; she is not anemic, WBC count normalized, has been

passing gas and a very small amount of stool and she is now tolerating a regular

diet.  No complaints of pain.  Is getting around well; will discharge to home as

her daughter will be staying with her for several days.





- Vitals & Intake/Output


Vital Signs: 





                                   Vital Signs











Temperature  98.6 F   06/29/21 08:00


 


Pulse Rate  71   06/29/21 08:00


 


Respiratory Rate  18   06/29/21 08:00


 


Blood Pressure  137/76   06/29/21 08:00


 


O2 Sat by Pulse Oximetry  96   06/29/21 08:00











Intake & Output: 





                                 Intake & Output











 06/26/21 06/27/21 06/28/21 06/29/21





 11:59 11:59 11:59 11:59


 


Intake Total 2280 230 480 360


 


Output Total 2000 6254 335 6284


 


Balance 280 -1270 180 -990














- Lab


Result Diagrams: 


                                 06/27/21 06:18





                                 06/27/21 06:18


Lab Results-Last 24 Hrs: 





                            Lab Results-Last 24 Hours











  06/21/21 Range/Units





  15:10 


 


Reporting Documentation  Not Reportable  











Micro Results-Entire Visit: 





                                  Microbiology











 06/24/21 15:00 Urine Culture - Final





 Urine, Catheterized    NO GROWTH














- Procedures and Test


Procedures and Tests throughout Hospitalization: 





                            Therapy Orders & Screens





06/24/21 21:32


Oxygen NASAL CANNULA 2 lpm 


   Comment: O2 SAT 88% ON RM AIR


   Diagnosis: Right hemicolectomy





06/25/21 16:46


Incentive Spirometry UD 


   Comment: 


   Diagnosis: Right hemicolectomy














Discharge Exam


General Appearance: no apparent distress, alert


Neurologic Exam: oriented x 3, cooperative


Eye Exam: eyes nml inspection


Ears, Nose, Throat Exam: moist mucous membranes


Neck Exam: normal inspection


Respiratory Exam: normal breath sounds, lungs clear, No crackles/rales, No 

rhonchi, No wheezing


Cardiovascular Exam: regular rate/rhythm, normal heart sounds, No murmur


Gastrointestinal/Abdomen Exam: soft, normal bowel sounds, No tenderness, No 

distention


Extremity Exam: normal inspection, No pedal edema, No swelling


Skin Exam: normal color, warm, dry, No rash


Wound Assessment: 





                              Skin/Wound Assessment





Wound/Incision Assessment                                  Start:  06/24/21 

20:00


Text:                                                      Status: Active       




Freq:   Q4H                                                                     




Protocol:                                                                       




 Document     06/29/21 08:00  [facility]  (Rec: 06/29/21 09:13  [facility]  [id number]


 Wound/Incision Assessment


     Abdomen


      Wound Assessment                           Shift Assessment


      Wound Type                                 Incision


      Wound Stage                                Non Pressure Wound


      Drainage Amount                            None


      Drainage Odor                              None/Absent


      General Appearance                         Well Approximated,Staples


                                                 Intact,Open to air


      Surrounding Tissue                         Pink


 Wound Photo


     Photo Taken                                 No











Final Diagnosis/Problem List





- Final Discharge Diagnosis/Problem


(1) S/P partial colectomy


Current Visit: Yes   Status: Acute   


Assessment & Plan: 


Doing great. POD #5.  I anticipate that surgery will want to discharge her to 

home today, but await their order.


Code(s): Z90.49 - ACQUIRED ABSENCE OF OTHER SPECIFIED PARTS OF DIGESTIVE TRACT  







(2) Heterozygous factor V Leiden mutation


Current Visit: Yes   Status: Chronic   Code(s): D68.51 - ACTIVATED PROTEIN C 

RESISTANCE   





(3) Myelodysplastic syndrome


Current Visit: No   Status: Chronic   Code(s): D46.9 - MYELODYSPLASTIC SYNDROME,

UNSPECIFIED   





(4) Anemia


Current Visit: Yes   Status: Acute   Code(s): D64.9 - ANEMIA, UNSPECIFIED   





- Discharge


Disposition: Home, Self-Care


Condition: Stable


Prescriptions: 


New


   Hydrocodone/Acetaminophen [Hydrocodone-Acetamin 5-325 mg***] 1 tab PO Q4HPRN 

PRN #20 tablet MDD 5


     PRN Reason: Pain





No Action


   Clonidine HCl 0.1 mg*** [Catapres 0.1 MG***] 0.1 mg PO BID


   Amlodipine Besylate 5 mg*** [Norvasc 5 mg***] 5 mg PO DAILY


   Acetaminophen [Tylenol] 325 mg PO Q6H PRN PRN


     PRN Reason: Mild Pain


   Apixaban [Eliquis] 5 mg PO BID #60 tablet


   Enoxaparin Sodium [Lovenox] 40 mg SQ DAILY


Follow up with: 


GANESH HUTTON [ACTIVE STAFF] - 


BIRD TSANG [Primary Care Provider] - 


Forms:  Patient Portal Information

## 2022-06-16 NOTE — HP
AMENDED REPORT: 



DATE OF SURGERY:  06/23/2022 



HISTORY OF PRESENT ILLNESS:  The patient is an 81-year-old female who presents for follow 
up of colon cancer. The patient had a right hemicolectomy in June 2021 for colon cancer. 
The patient does have any symptoms at this time. 



PAST MEDICAL HISTORY:  Colon cancer. Raynaud's. Blood clot. Gout. Hypertension. 



PAST SURGICAL HISTORY:  Right hemicolectomy. Partial hysterectomy.  



ALLERGIES:  DILAUDID.  TAPE.



MEDICATIONS: Clonidine, Eliquis, amlodipine, iron, allopurinol, calcium with vitamin D3. 



FAMILY HISTORY:  None. 



SOCIAL HISTORY:  None.



REVIEW OF SYSTEMS: CONSTITUTIONAL:  Denies fever or chills. CHEST: Denies shortness of 
breath. CVS: Denies chest pain. ABDOMEN: Denies abdominal pain. 



PHYSICAL EXAMINATION:  

GENERAL:  No acute distress.  

CHEST: Nonlabored. No shortness of breath. 

CVS:  Regular rate and rhythm.

ABDOMEN: Soft. 



IMPRESSION: History of colon cancer. 



PLAN:  Colonoscopy with Dr. Caesar Beltran. 



As dictated by Deya Agee NP.

## 2022-06-23 ENCOUNTER — HOSPITAL ENCOUNTER (OUTPATIENT)
Dept: HOSPITAL 33 - SDC | Age: 82
Discharge: HOME | End: 2022-06-23
Attending: SURGERY
Payer: MEDICARE

## 2022-06-23 VITALS — DIASTOLIC BLOOD PRESSURE: 76 MMHG | SYSTOLIC BLOOD PRESSURE: 130 MMHG | HEART RATE: 67 BPM | OXYGEN SATURATION: 95 %

## 2022-06-23 DIAGNOSIS — K64.8: ICD-10-CM

## 2022-06-23 DIAGNOSIS — K57.30: ICD-10-CM

## 2022-06-23 DIAGNOSIS — Z08: Primary | ICD-10-CM

## 2022-06-23 DIAGNOSIS — Z90.49: ICD-10-CM

## 2022-06-23 DIAGNOSIS — Z85.038: ICD-10-CM

## 2022-06-23 DIAGNOSIS — Z79.01: ICD-10-CM

## 2022-06-23 PROCEDURE — 99100 ANES PT EXTEME AGE<1 YR&>70: CPT

## 2022-06-23 NOTE — OP
SURGERY DATE:    06/23/2022



SURGERY TIME:    1000    



PREOPERATIVE DIAGNOSIS: 

1.  RIGHT HEMICOLECTOMY 1 YEAR AGO.



POSTOPERATIVE DIAGNOSIS:

1.  NORMAL ANASTOMOSIS.

2.  MODERATE SIGMOID DIVERTICULOSIS.

3.  MODERATE INTERNAL HEMORRHOIDS.



PROCEDURE:

1.  Colonoscopy complete.



SURGEON:        Caesar Beltran M.D.



ANESTHESIA:    MAC.



COMPLICATIONS:    None.



CONDITION:        Stable.



INDICATION:  Patient had colon resection for cancer 1 year ago. She presents for 1 year 
follow-up.



OPERATIVE PROCEDURE:     She was taken to endoscopy. Left lateral decubitus position. Anal 
digital examination satisfactory. Scope introduced. Prep score was excellent. Scope 
advanced up to the anastomosis. Anastomosis mid abdomen. Ileocolic side-to-side was 
excellent. No suggestion of any issues. Circumferential withdrawal. There was moderate 
sigmoid diverticulosis. There were moderate internal hemorrhoids. Normal exam. Patient 
tolerated the procedure satisfactory.



PLAN:  Follow-up in 2 years.